# Patient Record
Sex: MALE | Race: WHITE | NOT HISPANIC OR LATINO | Employment: FULL TIME | URBAN - NONMETROPOLITAN AREA
[De-identification: names, ages, dates, MRNs, and addresses within clinical notes are randomized per-mention and may not be internally consistent; named-entity substitution may affect disease eponyms.]

---

## 2017-06-07 ENCOUNTER — TELEPHONE (OUTPATIENT)
Dept: OTOLARYNGOLOGY | Facility: CLINIC | Age: 47
End: 2017-06-07

## 2017-06-07 DIAGNOSIS — J39.8 TRACHEAL STENOSIS: Primary | ICD-10-CM

## 2017-06-07 NOTE — TELEPHONE ENCOUNTER
Dr. Verduzco asked that we try to get patient a sooner appointment with [facility]. I have called and patient did not have an appointment. The first available is 7/13/17 at 900. Patient notified. We will have patient get a CT soft tissue neck and CT chest to include the carana and have him fu with Dr. Verduzco after CT.

## 2017-06-12 ENCOUNTER — HOSPITAL ENCOUNTER (OUTPATIENT)
Dept: CT IMAGING | Facility: HOSPITAL | Age: 47
Discharge: HOME | End: 2017-06-12
Attending: OTOLARYNGOLOGY | Admitting: OTOLARYNGOLOGY

## 2017-06-12 ENCOUNTER — HOSPITAL ENCOUNTER (OUTPATIENT)
Dept: CT IMAGING | Facility: HOSPITAL | Age: 47
Discharge: HOME | End: 2017-06-12
Attending: OTOLARYNGOLOGY

## 2017-06-12 DIAGNOSIS — J39.8 TRACHEAL STENOSIS: ICD-10-CM

## 2017-06-12 PROCEDURE — 71260 CT THORAX DX C+: CPT

## 2017-06-12 PROCEDURE — 82565 ASSAY OF CREATININE: CPT

## 2017-06-12 PROCEDURE — 70491 CT SOFT TISSUE NECK W/DYE: CPT

## 2017-06-12 PROCEDURE — 0 IOPAMIDOL 61 % SOLUTION: Performed by: OTOLARYNGOLOGY

## 2017-06-12 RX ADMIN — IOPAMIDOL 100 ML: 612 INJECTION, SOLUTION INTRAVENOUS at 15:15

## 2017-06-13 LAB — CREAT BLDA-MCNC: 1.3 MG/DL (ref 0.6–1.3)

## 2017-06-20 ENCOUNTER — OFFICE VISIT (OUTPATIENT)
Dept: OTOLARYNGOLOGY | Facility: CLINIC | Age: 47
End: 2017-06-20

## 2017-06-20 VITALS
HEART RATE: 58 BPM | HEIGHT: 71 IN | BODY MASS INDEX: 31.64 KG/M2 | TEMPERATURE: 96.8 F | WEIGHT: 226 LBS | DIASTOLIC BLOOD PRESSURE: 91 MMHG | SYSTOLIC BLOOD PRESSURE: 151 MMHG

## 2017-06-20 DIAGNOSIS — Z98.84 STATUS POST GASTRIC BYPASS FOR OBESITY: ICD-10-CM

## 2017-06-20 DIAGNOSIS — J38.6 SUBGLOTTIC STENOSIS: Primary | ICD-10-CM

## 2017-06-20 PROCEDURE — 31575 DIAGNOSTIC LARYNGOSCOPY: CPT | Performed by: OTOLARYNGOLOGY

## 2017-06-20 PROCEDURE — 99213 OFFICE O/P EST LOW 20 MIN: CPT | Performed by: OTOLARYNGOLOGY

## 2017-06-20 NOTE — PROGRESS NOTES
OPERATIVE NOTE:  Jerome Crawley    DATE OF PROCEDURE: 06/20/2017    PROCEDURE:   Flexible Fiberoptic Laryngoscopy    ANESTHESIA:  None    REASON FOR PROCEDURE:  Procedure was recommend for persistant hoarseness and re-evaluation of a lesion previously documented  Risks, benefits and alternatives were discussed.      DETAILS of OPERATION:  The patient was seated in the exam chair.  A flexible fiberoptic laryngoscopy was performed through the oral cavity.  The scope was introduced into the oral cavity and directed to the level of the glottis, examining the structures of the oropharynx, base of tongue, vallecula, supraglottic larynx, glottic larynx, and hypopharynx.      FINDINGS:  Mucosal surfaces:   The mucosal surfaces demonstrated normal mucosa surfaces with mild inflammation    Base of tongue:  The base of tongue was found to have no mass or lesion.    Epiglottis:  The epiglottis was found to have no mass or lesion.    Aryepligottic fold:  The AE folds were found to have no mass or lesion.    False Vocal Fold:  The false cords were found to have no mass or lesion.    True Vocal Cord:  The true vocal cords were found to have no mass or lesion.  Both true vocal cords abducted and abducted normally to the midline    Arytenoid:   The arytenoids were found to have no mass or lesion.    Hypopharynx:  The hypopharynx was found to have no mass or lesion.    Subglottic lesions or obstructions can be appreciated and the patient was not acutely stridorous    The patient tolerated procedure well.

## 2017-06-20 NOTE — PROGRESS NOTES
YOB: 1970  Location: [facility]  Location Address: [address] [place] [address], [facility], [address] [place] [address]  Location Phone: [phone]    Chief Complaint   Patient presents with   • Follow-up     possible tracheal stenosis       History of Present Illness  Jerome Crawley is a 46 y.o. male.  Jerome Crawley is here for evaluation of ENT complaints. The patient has had problems with breathing, dysphagia and tracheal stenosis  The symptoms are localized to the generalized location of the throat/trachea. The patient has had moderate symptoms. The symptoms have been present for the last several months . The symptoms are aggravated by  swallowing . Patient had gastrointestinal surgery and had to have a tracheostomy on 12/16/15. Patient has since had trach removed but has recently experienced difficulty breathing. Patient also has sore throat, nasal congestion, nasal drainage and postnasal drainage. He has a history of sleep apnea and GERD.     He is approximately 1 year status post endoscopic balloon dilatation at [facility] for subglottic stenosis. He also had some tissue removed -laser or other I do not know without OP Report.    Study Result   EXAMINATION: CT CHEST W CONTRAST-      2017 2:39 PM CDT      HISTORY: tracheal stenosis; J39.8-Other specified diseases of upper  respiratory tract      In order to have a CT radiation dose as low as reasonably achievable  Automated Exposure Control was utilized for adjustment of the mA and/or  KV according to patient size.      DLP in mGycm= 1335.      Axial, sagittal, and coronal postcontrast chest CT imaging.      Subglottic tracheal stenosis again seen. This appears to be slightly  less severe.  The stenosis lies 7.2 cm above the pia.  Below the level of stenosis there is a normal appearance of the distal  trachea, pia, and each bronchus.      The lungs are fully expanded and clear.      Normal heart size.  No mediastinal mass.      Summary:  1. Known  subglottic tracheal stenosis. The distal trachea and pia are  normal.         Study Result   EXAMINATION: CT SOFT TISSUE NECK W CONTRAST-      6/12/2017 2:39 PM CDT      HISTORY: tracheal stenosis; J39.8-Other specified diseases of upper  respiratory tract      In order to have a CT radiation dose as low as reasonably achievable  Automated Exposure Control was utilized for adjustment of the mA and/or  KV according to patient size.      DLP in mGycm= 298.      Axial, sagittal, and coronal postcontrast CT imaging of the neck.      Essentially clear paranasal sinuses.  Normal nasopharynx and parapharyngeal spaces.  Normal and symmetric parotid and submandibular glands.      Normal thyroid gland.  Clear lung apices.      Web like subglottic stenosis approximately 3.5 cm below the level of the  vocal cords.  The stenosis is slightly less prominent now.   AP diameter of the airway is 6-7 mm compared with 2-3 mm on the previous  exam.      Summary:  1. Subglottic stenosis appears to be slightly less severe as compared  with 1 year ago. No new abnormality.                Past Medical History:   Diagnosis Date   • Abdominal hernia    • GERD (gastroesophageal reflux disease)     Mild   • Hyperlipidemia    • Hypertension    • Sleep apnea     CPAP; no longer needed       Past Surgical History:   Procedure Laterality Date   • BRONCHOSCOPY  12/16/2015    Flexible Fiberoptic Bronchoscopy with Debridement of Tracheal Fibrinous exudate   • ENDOSCOPY  12/11/2015    Dr Albaro Leep [facility]     • EXPLORATORY LAPAROTOMY  11/29/2015    Dr Shay    • EXPLORATORY LAPAROTOMY  12/16/2015    with washout, lysis of adhesions, feeding tube placement and repair of small bowel injury and esophagogastroduodenoscopy with Dr Hendricks    • HAND SURGERY  2007    Crushed hand    • HERNIA REPAIR  11/29/2015    Hernia reduction of internal hernia-Dr Shay [facility]     • LAPAROSCOPIC GASTRIC BYPASS  11/25/2015     Dr Shay [facility]    • LYSIS OF ABDOMINAL ADHESIONS  11/29/2015    Dr Irvin [facility]    • MANDIBLE FRACTURE SURGERY  1988   • SMALL INTESTINE SURGERY  11/29/2015    repair of small bowel injury and closure of mesenteric defect, Dr Shay [facility]      • SMALL INTESTINE SURGERY      repair per Dr Shay on 12-16-15   • TRACHEOSTOMY  12/16/2015    Dr Shay          Current Outpatient Prescriptions:   •  calcium citrate-vitamin d (CITRACAL) 200-250 MG-UNIT tablet tablet, Take 1 tablet by mouth Daily., Disp: , Rfl:   •  carvedilol (COREG) 12.5 MG tablet, 1 TABLET BY MOUTH TWICE A DAY TAKES THE PLACE OF COREG CR 40MG, Disp: , Rfl: 1  •  doxazosin (CARDURA) 2 MG tablet, Take 2 mg by mouth Every Night., Disp: , Rfl:   •  Multiple Vitamin (MULTI VITAMIN PO), Take  by mouth Daily. Bariatric Vitamin, Disp: , Rfl:   •  Omeprazole (PRILOSEC PO), Take 20 mg by mouth Daily., Disp: , Rfl:     Other    Family History   Problem Relation Age of Onset   • Obesity Other    • Diabetes Other    • Hypertension Other    • Heart disease Other    • Cancer Other        Social History     Social History   • Marital status: Single     Spouse name: N/A   • Number of children: N/A   • Years of education: N/A     Occupational History   • Not on file.     Social History Main Topics   • Smoking status: Never Smoker   • Smokeless tobacco: Not on file   • Alcohol use No   • Drug use: No   • Sexual activity: Not on file     Other Topics Concern   • Not on file     Social History Narrative       Review of Systems   Constitutional: Negative.    HENT: Positive for congestion, postnasal drip, rhinorrhea, sore throat and trouble swallowing.    Eyes: Negative.    Respiratory: Positive for apnea and shortness of breath.    Cardiovascular: Negative.    Gastrointestinal: Negative.    Endocrine: Negative.    Genitourinary: Negative.    Musculoskeletal: Negative.    Skin: Negative.     Allergic/Immunologic: Negative.    Neurological: Negative.    Hematological: Negative.    Psychiatric/Behavioral: Negative.        Vitals:    06/20/17 1011   BP: 151/91   Pulse: 58   Temp: 96.8 °F (36 °C)       Objective     Physical Exam  CONSTITUTIONAL: Mildly overweight, well nourished, alert, oriented, in no acute distress     COMMUNICATION AND VOICE: able to communicate normally, normal voice quality    HEAD: normocephalic, no lesions, atraumatic, no tenderness, no masses     FACE: appearance normal, no lesions, no tenderness, no deformities, facial motion symmetric    SALIVARY GLANDS: parotid glands with no tenderness, no swelling, no masses, submandibular glands with normal size, nontender    EYES: ocular motility normal, eyelids normal, orbits normal, no proptosis, conjunctiva normal , pupils equal, round     EARS:  Hearing: response to conversational voice normal bilaterally   External Ears: auricles without lesions  Otoscopic: tympanic membrane appearance normal, no lesions, no perforation, normal mobility, no fluid    NOSE:  External Nose: structure normal, no tenderness on palpation, no nasal discharge, no lesions, no evidence of trauma, nostrils patent   Intranasal Exam: nasal mucosa normal, vestibule within normal limits, inferior turbinate normal, nasal septum midline   Nasopharynx:     ORAL:  Lips: upper and lower lips without lesion   Teeth: dentition within normal limits for age   Gums: gingivae healthy   Oral Mucosa: oral mucosa normal, no mucosal lesions   Floor of Mouth: Warthin’s duct patent, mucosa normal  Tongue: lingual mucosa normal without lesions, normal tongue mobility   Palate: soft and hard palates with normal mucosa and structure  Oropharynx: oropharyngeal mucosa normal    HYPOPHARYNX:   LARYNX: See Endo    NECK: neck appearance normal, no mass,  noted without erythema or tenderness    THYROID: no overt thyromegaly, no tenderness, nodules or mass present on palpation, position  midline     LYMPH NODES: no lymphadenopathy    CHEST/RESPIRATORY: respiratory effort normal, normal breath sounds     CARDIOVASCULAR: rate and rhythm normal, extremities without cyanosis or edema      NEUROLOGIC/PSYCHIATRIC: oriented to time, place and person, mood normal, affect appropriate, CN II-XII intact grossly    Assessment/Plan   Jerome was seen today for follow-up.    Diagnoses and all orders for this visit:    Subglottic stenosis  Comments:  History of balloon dilatation    Status post gastric bypass for obesity      * Surgery not found *  No orders of the defined types were placed in this encounter.    Return in about 3 months (around 9/20/2017).       Patient Instructions   Follow-up at [facility]  Call or return for problems particularly increasing shortness of breath  Discussed with radiology    Follow-up in 3 months

## 2017-06-20 NOTE — PATIENT INSTRUCTIONS
Follow-up at [facility]  Call or return for problems particularly increasing shortness of breath  Discussed with radiology    Follow-up in 3 months

## 2017-07-10 ENCOUNTER — HOSPITAL ENCOUNTER (OUTPATIENT)
Dept: PULMONOLOGY | Facility: HOSPITAL | Age: 47
Discharge: HOME | End: 2017-07-10
Attending: OTOLARYNGOLOGY | Admitting: OTOLARYNGOLOGY

## 2017-07-10 VITALS — BODY MASS INDEX: 32.07 KG/M2 | HEIGHT: 70 IN | WEIGHT: 224 LBS

## 2017-07-10 DIAGNOSIS — J38.6 SUBGLOTTIC STENOSIS: ICD-10-CM

## 2017-07-10 PROCEDURE — 94726 PLETHYSMOGRAPHY LUNG VOLUMES: CPT

## 2017-07-10 PROCEDURE — 94060 EVALUATION OF WHEEZING: CPT

## 2017-07-10 PROCEDURE — 94729 DIFFUSING CAPACITY: CPT

## 2017-07-10 RX ORDER — ALBUTEROL SULFATE 2.5 MG/3ML
2.5 SOLUTION RESPIRATORY (INHALATION) ONCE
Status: COMPLETED | OUTPATIENT
Start: 2017-07-10 | End: 2017-07-10

## 2017-07-10 RX ADMIN — ALBUTEROL SULFATE 2.5 MG: 2.5 SOLUTION RESPIRATORY (INHALATION) at 14:46

## 2017-10-26 ENCOUNTER — OFFICE VISIT (OUTPATIENT)
Dept: BARIATRICS/WEIGHT MGMT | Facility: CLINIC | Age: 47
End: 2017-10-26

## 2017-10-26 ENCOUNTER — APPOINTMENT (OUTPATIENT)
Dept: LAB | Facility: HOSPITAL | Age: 47
End: 2017-10-26
Attending: NURSE PRACTITIONER

## 2017-10-26 VITALS
HEIGHT: 71 IN | SYSTOLIC BLOOD PRESSURE: 151 MMHG | BODY MASS INDEX: 31.25 KG/M2 | OXYGEN SATURATION: 100 % | TEMPERATURE: 99.1 F | WEIGHT: 223.2 LBS | HEART RATE: 71 BPM | DIASTOLIC BLOOD PRESSURE: 84 MMHG

## 2017-10-26 DIAGNOSIS — Z13.21 SCREENING FOR MALNUTRITION: ICD-10-CM

## 2017-10-26 DIAGNOSIS — Z98.84 STATUS POST GASTRIC BYPASS FOR OBESITY: Primary | ICD-10-CM

## 2017-10-26 LAB
25(OH)D3 SERPL-MCNC: 46.6 NG/ML (ref 30–100)
ALBUMIN SERPL-MCNC: 4.5 G/DL (ref 3.5–5)
ALBUMIN/GLOB SERPL: 1.5 G/DL (ref 1.1–2.5)
ALP SERPL-CCNC: 86 U/L (ref 24–120)
ALT SERPL W P-5'-P-CCNC: 44 U/L (ref 0–54)
ANION GAP SERPL CALCULATED.3IONS-SCNC: 14 MMOL/L (ref 4–13)
AST SERPL-CCNC: 33 U/L (ref 7–45)
BILIRUB SERPL-MCNC: 0.4 MG/DL (ref 0.1–1)
BUN BLD-MCNC: 20 MG/DL (ref 5–21)
BUN/CREAT SERPL: 14.5 (ref 7–25)
CALCIUM SPEC-SCNC: 9.2 MG/DL (ref 8.4–10.4)
CHLORIDE SERPL-SCNC: 102 MMOL/L (ref 98–110)
CO2 SERPL-SCNC: 28 MMOL/L (ref 24–31)
CREAT BLD-MCNC: 1.38 MG/DL (ref 0.5–1.4)
DEPRECATED RDW RBC AUTO: 45.5 FL (ref 40–54)
ERYTHROCYTE [DISTWIDTH] IN BLOOD BY AUTOMATED COUNT: 13.3 % (ref 12–15)
FOLATE SERPL-MCNC: 13.2 NG/ML
GFR SERPL CREATININE-BSD FRML MDRD: 55 ML/MIN/1.73
GLOBULIN UR ELPH-MCNC: 3 GM/DL
GLUCOSE BLD-MCNC: 67 MG/DL (ref 70–100)
HCT VFR BLD AUTO: 42.1 % (ref 40–52)
HGB BLD-MCNC: 13.7 G/DL (ref 14–18)
IRON 24H UR-MRATE: 55 MCG/DL (ref 42–180)
MAGNESIUM SERPL-MCNC: 2.2 MG/DL (ref 1.4–2.2)
MCH RBC QN AUTO: 30.5 PG (ref 28–32)
MCHC RBC AUTO-ENTMCNC: 32.5 G/DL (ref 33–36)
MCV RBC AUTO: 93.8 FL (ref 82–95)
PHOSPHATE SERPL-MCNC: 3.6 MG/DL (ref 2.5–4.5)
PLATELET # BLD AUTO: 206 10*3/MM3 (ref 130–400)
PMV BLD AUTO: 10 FL (ref 6–12)
POTASSIUM BLD-SCNC: 4.8 MMOL/L (ref 3.5–5.3)
PROT SERPL-MCNC: 7.5 G/DL (ref 6.3–8.7)
RBC # BLD AUTO: 4.49 10*6/MM3 (ref 4.8–5.9)
SODIUM BLD-SCNC: 144 MMOL/L (ref 135–145)
VIT B12 BLD-MCNC: 413 PG/ML (ref 239–931)
WBC NRBC COR # BLD: 8.53 10*3/MM3 (ref 4.8–10.8)

## 2017-10-26 PROCEDURE — 83735 ASSAY OF MAGNESIUM: CPT | Performed by: NURSE PRACTITIONER

## 2017-10-26 PROCEDURE — 84446 ASSAY OF VITAMIN E: CPT | Performed by: NURSE PRACTITIONER

## 2017-10-26 PROCEDURE — 85027 COMPLETE CBC AUTOMATED: CPT | Performed by: NURSE PRACTITIONER

## 2017-10-26 PROCEDURE — 83540 ASSAY OF IRON: CPT | Performed by: NURSE PRACTITIONER

## 2017-10-26 PROCEDURE — 84100 ASSAY OF PHOSPHORUS: CPT | Performed by: NURSE PRACTITIONER

## 2017-10-26 PROCEDURE — 80053 COMPREHEN METABOLIC PANEL: CPT | Performed by: NURSE PRACTITIONER

## 2017-10-26 PROCEDURE — 82746 ASSAY OF FOLIC ACID SERUM: CPT | Performed by: NURSE PRACTITIONER

## 2017-10-26 PROCEDURE — 84597 ASSAY OF VITAMIN K: CPT | Performed by: NURSE PRACTITIONER

## 2017-10-26 PROCEDURE — 84425 ASSAY OF VITAMIN B-1: CPT | Performed by: NURSE PRACTITIONER

## 2017-10-26 PROCEDURE — 36415 COLL VENOUS BLD VENIPUNCTURE: CPT | Performed by: NURSE PRACTITIONER

## 2017-10-26 PROCEDURE — 99213 OFFICE O/P EST LOW 20 MIN: CPT | Performed by: NURSE PRACTITIONER

## 2017-10-26 PROCEDURE — 84630 ASSAY OF ZINC: CPT | Performed by: NURSE PRACTITIONER

## 2017-10-26 PROCEDURE — 82306 VITAMIN D 25 HYDROXY: CPT | Performed by: NURSE PRACTITIONER

## 2017-10-26 PROCEDURE — 84590 ASSAY OF VITAMIN A: CPT | Performed by: NURSE PRACTITIONER

## 2017-10-26 PROCEDURE — 82607 VITAMIN B-12: CPT | Performed by: NURSE PRACTITIONER

## 2017-10-26 RX ORDER — BIOTIN 10000 MCG
1 CAPSULE ORAL DAILY
COMMUNITY
End: 2018-01-25

## 2017-10-26 NOTE — PROGRESS NOTES
"Subjective   Jerome Crawley is a 47 y.o. male.     History of Present Illness   Jerome Crawley is post RYGB bariatric surgery with Dr. Shay on 11/25/15. This is his 2 year follow-up. Patient is able to get 48 ounces of water in per day. Patient is able to get protein at each of his 3 meals per day, but is unsure of total protein grams.  Patient is taking a multiple vitamin per day. Patient is taking Calcium with vitamin D supplement 2 times daily. Patient is exercising by walking on treadmill for 20-45 minutes and 4-6 times per week. Patient does not have any complaints of reflux, dysphagia, N/V, or abdominal pain. He is on PPI for reflux.   Vitals:    10/26/17 1524   BP: 151/84   BP Location: Left arm   Patient Position: Sitting   Cuff Size: Adult   Pulse: 71   Temp: 99.1 °F (37.3 °C)   SpO2: 100%   Weight: 223 lb 3.2 oz (101 kg)   Height: 71\" (180.3 cm)         The following portions of the patient's history were reviewed and updated as appropriate: allergies, current medications, past family history, past medical history, past social history, past surgical history and problem list.    Review of Systems   Constitutional: Negative for activity change, appetite change and fatigue.   HENT: Negative.    Eyes: Negative.         Vision problems   Respiratory: Negative.  Negative for apnea, cough, chest tightness and shortness of breath.    Cardiovascular: Negative.  Negative for chest pain, palpitations and leg swelling.   Gastrointestinal: Negative.  Negative for abdominal pain, anal bleeding, constipation, nausea and vomiting.   Endocrine: Negative.         Hair loss   Genitourinary: Negative.    Musculoskeletal: Negative.  Negative for arthralgias and back pain.   Skin: Negative.    Allergic/Immunologic: Negative.    Neurological: Negative.  Negative for seizures and syncope.   Hematological: Negative.  Does not bruise/bleed easily.   Psychiatric/Behavioral: Positive for sleep disturbance. Negative for dysphoric " mood and self-injury.       Objective   Physical Exam   Constitutional: He is oriented to person, place, and time. Vital signs are normal. He appears well-developed and well-nourished. He is cooperative. No distress.   HENT:   Head: Normocephalic and atraumatic.   Nose: Nose normal.   Mouth/Throat: Oropharynx is clear and moist. No oropharyngeal exudate or tonsillar abscesses.   Eyes: Conjunctivae, EOM and lids are normal. Pupils are equal, round, and reactive to light. Right eye exhibits no discharge. Left eye exhibits no discharge.   Neck: Trachea normal. Neck supple. No JVD present. Carotid bruit is not present. No rigidity. No tracheal deviation present. No thyromegaly present.   Healed scar noted to lower anterior neck   Cardiovascular: Normal rate, regular rhythm, S1 normal, S2 normal and normal heart sounds.    Pulmonary/Chest: Effort normal and breath sounds normal. No stridor. No respiratory distress. He has no wheezes. He has no rales.   Abdominal: Soft. Bowel sounds are normal. He exhibits no distension. There is no tenderness.   Obese; healed scars noted; no hernia   Musculoskeletal: He exhibits no edema.        Right shoulder: He exhibits normal strength.   Lymphadenopathy:     He has no cervical adenopathy.   Neurological: He is alert and oriented to person, place, and time. He has normal strength. No cranial nerve deficit.   Skin: Skin is warm, dry and intact. No rash noted.   Psychiatric: He has a normal mood and affect. His speech is normal and behavior is normal.   Alert and oriented x 3   Vitals reviewed.      Assessment/Plan   Jerome was seen today for post-op, obesity, weight loss and nutrition counseling.    Diagnoses and all orders for this visit:    Status post gastric bypass for obesity  -     CBC (No Diff)  -     Comprehensive Metabolic Panel  -     Folate  -     Iron  -     Magnesium  -     Phosphorus  -     Vitamin A  -     Vitamin B1, Whole Blood  -     Vitamin B12  -     Vitamin D 25  Hydroxy  -     Vitamin E  -     Vitamin K1  -     Zinc    Body mass index 31.0-31.9, adult  -     CBC (No Diff)  -     Comprehensive Metabolic Panel  -     Folate  -     Iron  -     Magnesium  -     Phosphorus  -     Vitamin A  -     Vitamin B1, Whole Blood  -     Vitamin B12  -     Vitamin D 25 Hydroxy  -     Vitamin E  -     Vitamin K1  -     Zinc    Screening for malnutrition  -     CBC (No Diff)  -     Comprehensive Metabolic Panel  -     Folate  -     Iron  -     Magnesium  -     Phosphorus  -     Vitamin A  -     Vitamin B1, Whole Blood  -     Vitamin B12  -     Vitamin D 25 Hydroxy  -     Vitamin E  -     Vitamin K1  -     Zinc          Patient is doing well overall. BMI 31.13.   Lab work will be drawn today. Results will be called to you once everything is completed.    Goals: continue positive lifestyle changes with diet and exercise.   Handout provided on support group meetings, 5 1/2 cup meals handout provided, post op annual visit handout provided.  Patient will follow up in 12 months and will call the office if needs anything prior to that appointment.

## 2017-10-26 NOTE — PATIENT INSTRUCTIONS
Patient is doing well overall. BMI 31.13.   Lab work will be drawn today. Results will be called to you once everything is completed.    Goals: continue positive lifestyle changes with diet and exercise.   Handout provided on support group meetings, 5 1/2 cup meals handout provided, post op annual visit handout provided.  Patient will follow up in 12 months and will call the office if needs anything prior to that appointment.

## 2017-10-28 LAB — ZINC SERPL-MCNC: 54 UG/DL (ref 56–134)

## 2017-10-30 LAB
A-TOCOPHEROL VIT E SERPL-MCNC: 13.4 MG/L (ref 5.3–17.5)
VIT A SERPL-MCNC: 74 UG/DL (ref 24–85)

## 2017-11-01 LAB — VIT B1 BLD-SCNC: 162.5 NMOL/L (ref 66.5–200)

## 2017-11-02 LAB — PHYTONADIONE SERPL-MCNC: 0.54 NG/ML (ref 0.13–1.88)

## 2018-01-25 ENCOUNTER — OFFICE VISIT (OUTPATIENT)
Dept: GASTROENTEROLOGY | Facility: CLINIC | Age: 48
End: 2018-01-25

## 2018-01-25 VITALS
SYSTOLIC BLOOD PRESSURE: 144 MMHG | BODY MASS INDEX: 31.08 KG/M2 | HEART RATE: 62 BPM | TEMPERATURE: 95.4 F | HEIGHT: 71 IN | OXYGEN SATURATION: 99 % | WEIGHT: 222 LBS | DIASTOLIC BLOOD PRESSURE: 88 MMHG

## 2018-01-25 DIAGNOSIS — K27.9 PEPTIC ULCER DISEASE: ICD-10-CM

## 2018-01-25 DIAGNOSIS — I10 ESSENTIAL HYPERTENSION: ICD-10-CM

## 2018-01-25 DIAGNOSIS — Z87.19 HISTORY OF GI BLEED: Primary | ICD-10-CM

## 2018-01-25 PROCEDURE — 99204 OFFICE O/P NEW MOD 45 MIN: CPT | Performed by: NURSE PRACTITIONER

## 2018-01-25 RX ORDER — SUCRALFATE 1 G/1
1 TABLET ORAL 4 TIMES DAILY
COMMUNITY
End: 2020-04-24

## 2018-01-25 RX ORDER — PANTOPRAZOLE SODIUM 40 MG/1
40 TABLET, DELAYED RELEASE ORAL DAILY
COMMUNITY
End: 2020-04-24

## 2018-01-25 RX ORDER — POLYETHYLENE GLYCOL 3350, SODIUM CHLORIDE, SODIUM BICARBONATE, POTASSIUM CHLORIDE 420; 11.2; 5.72; 1.48 G/4L; G/4L; G/4L; G/4L
4000 POWDER, FOR SOLUTION ORAL SEE ADMIN INSTRUCTIONS
Qty: 4000 ML | Refills: 0 | Status: SHIPPED | OUTPATIENT
Start: 2018-01-25 | End: 2018-02-24 | Stop reason: HOSPADM

## 2018-01-25 NOTE — PROGRESS NOTES
"[facility] GASTROENTEROLOGY - OFFICE NOTE    1/25/2018    Jerome Crawley   1970    Primary Physician: Sanjay Tyson MD    Chief Complaint   Patient presents with   • GI Problem     dark stools         HISTORY OF PRESENT ILLNESS    Jerome Crawley is a 47 y.o. male presents For her history of GI bleed and \"ulcers\".  About 3-4 weeks ago he was in [place] and developed rectal bleeding manifested by red blood from his rectum.  Lasted about 12 hours.  He went to a hospital in [place] and was admitted.  He had an EGD was told he had a \"ulcer\".  Was told by physician there that he needed to have a colonoscopy in the near future.  He has had no further bleeding since then.  He has been taking omeprazole.  He has been on omeprazole for several years.  About a month prior to the episode of bleeding he was on ranitidine.  He denied taking any aspirin or NSAIDs.  Never had black stool.  No nausea or vomiting.  No abdominal pain.  No unintentional weight loss.  No family history of colorectal cancer colon polyps.      Past Medical History:   Diagnosis Date   • Abdominal hernia    • GERD (gastroesophageal reflux disease)     Mild   • Hyperlipidemia    • Hypertension    • Sleep apnea     CPAP; no longer needed       Past Surgical History:   Procedure Laterality Date   • BRONCHOSCOPY  12/16/2015    Flexible Fiberoptic Bronchoscopy with Debridement of Tracheal Fibrinous exudate   • ENDOSCOPY  12/11/2015    Dr Irvin [facility]     • EXPLORATORY LAPAROTOMY  11/29/2015    Dr Shay    • EXPLORATORY LAPAROTOMY  12/16/2015    with washout, lysis of adhesions, feeding tube placement and repair of small bowel injury and esophagogastroduodenoscopy with Dr Hendricks    • HAND SURGERY  2007    Crushed hand    • HERNIA REPAIR  11/29/2015    Hernia reduction of internal hernia-Dr Shay [facility]     • LAPAROSCOPIC GASTRIC BYPASS  11/25/2015    Dr Shay [facility]    • LYSIS " OF ABDOMINAL ADHESIONS  11/29/2015    Dr Irvin [facility]    • MANDIBLE FRACTURE SURGERY  1988   • SMALL INTESTINE SURGERY  11/29/2015    repair of small bowel injury and closure of mesenteric defect, Dr Shay [facility]      • SMALL INTESTINE SURGERY      repair per Dr Shay on 12-16-15   • TRACHEOSTOMY  12/16/2015    Dr Shay        Outpatient Prescriptions Marked as Taking for the 1/25/18 encounter (Office Visit) with MIGUEL ANGEL William   Medication Sig Dispense Refill   • calcium citrate-vitamin d (CITRACAL) 200-250 MG-UNIT tablet tablet Take 1 tablet by mouth Daily.     • carvedilol (COREG) 12.5 MG tablet 1 TABLET BY MOUTH TWICE A DAY TAKES THE PLACE OF COREG CR 40MG  1   • doxazosin (CARDURA) 2 MG tablet Take 2 mg by mouth Every Night.     • Multiple Vitamin (MULTI VITAMIN PO) Take  by mouth Daily. Bariatric Vitamin     • Omeprazole (PRILOSEC PO) Take 20 mg by mouth Daily.     • pantoprazole (PROTONIX) 40 MG EC tablet Take 40 mg by mouth Daily.     • sucralfate (CARAFATE) 1 g tablet Take 1 g by mouth 4 (Four) Times a Day.         Allergies   Allergen Reactions   • Other      Dust        Social History     Social History   • Marital status: Single     Spouse name: N/A   • Number of children: N/A   • Years of education: N/A     Occupational History   • Not on file.     Social History Main Topics   • Smoking status: Never Smoker   • Smokeless tobacco: Current User   • Alcohol use Yes      Comment: rare   • Drug use: No   • Sexual activity: Not on file     Other Topics Concern   • Not on file     Social History Narrative       Family History   Problem Relation Age of Onset   • Obesity Other    • Diabetes Other    • Hypertension Other    • Heart disease Other    • Cancer Other    • Colon cancer Neg Hx    • Colon polyps Neg Hx        Review of Systems   Constitutional: Negative for appetite change, chills, fatigue, fever and unexpected weight change.   HENT: Negative for  "sore throat and trouble swallowing.    Respiratory: Negative for cough, chest tightness, shortness of breath and wheezing.    Cardiovascular: Negative for chest pain and palpitations.   Gastrointestinal: Negative for abdominal distention, abdominal pain, constipation, diarrhea, nausea, rectal pain and vomiting.        As mentioned in hpi   Genitourinary: Negative for difficulty urinating and hematuria.   Musculoskeletal: Negative for arthralgias and back pain.   Skin: Negative for color change and rash.   Neurological: Negative for dizziness, seizures, syncope, light-headedness and headaches.   Psychiatric/Behavioral: Negative for confusion. The patient is not nervous/anxious.         Vitals:    01/25/18 0856   BP: 144/88   BP Location: Left arm   Patient Position: Sitting   Cuff Size: Adult   Pulse: 62   Temp: 95.4 °F (35.2 °C)   SpO2: 99%   Weight: 101 kg (222 lb)   Height: 180.3 cm (71\")      Body mass index is 30.96 kg/(m^2).    Physical Exam   Constitutional: He appears well-developed and well-nourished. No distress.   HENT:   Head: Normocephalic and atraumatic.   Eyes: EOM are normal. No scleral icterus.   Neck: Neck supple. No JVD present.   Cardiovascular: Normal rate, regular rhythm and normal heart sounds.    Pulmonary/Chest: Effort normal and breath sounds normal. No stridor.   Abdominal: Soft. Bowel sounds are normal. He exhibits no distension and no mass. There is no tenderness. There is no rebound and no guarding.   Musculoskeletal: He exhibits no edema.   Neurological: He is alert.   Skin: Skin is warm and dry. No rash noted.   Psychiatric: He has a normal mood and affect. His behavior is normal.   Vitals reviewed.              ASSESSMENT AND PLAN    Jerome was seen today for gi problem.    Diagnoses and all orders for this visit:    History of GI bleed  -     Case Request; Standing  -     Case Request    Peptic ulcer disease  -     Case Request; Standing  -     Case Request    Essential " "hypertension    Other orders  -     Implement Anesthesia Orders Day of Procedure; Standing  -     Obtain Informed Consent; Standing  -     polyethylene glycol-electrolytes (NULYTELY WITH FLAVOR PACKS) 420 g solution; Take 4,000 mL by mouth See Admin Instructions.    Recent GI bleed manifested by dark red blood from the rectum.  He did have an upper endoscopy that revealed a \"ulcer\".  He has never had a colonoscopy.  We are going to schedule him for colonoscopy as well.  We will repeat EGD as well to confirm healing of ulcer.  Recommend continue to take PPI on a daily basis.  No aspirin or NSAIDs.  The patient was advised to take any blood pressure or heart  medications the morning of  procedure if that is when he/she normally takes.  In the meantime he is to call with any questions concerns or problems.  Currently he has no signs of active bleeding.  We will also see if we can get records from the [facility], patient will call us to let us know the name of the facility.          ESOPHAGOGASTRODUODENOSCOPY WITH ANESTHESIA (N/A), COLONOSCOPY WITH ANESTHESIA (N/A)   Risk, benefits, and alternatives of endoscopy were explained in full.  They understand that there is a risk of bleeding, perforation, and infection.  The risk of perforation goes up with esophageal dilation.  Other options to evaluate UGI complaints could involve barium swallow or UGI series, but these would be diagnostic tests only.  Patient was given time to ask questions.  I answered them to their satisfaction and they are agreeable to proceedingAll risks, benefits, alternatives, and indications of colonoscopy procedure have been discussed with the patient. Risks to include perforation of the colon requiring possible surgery or colostomy, risk of bleeding from biopsies or removal of colon tissue, possibility of missing a colon polyp or cancer, or adverse drug reaction.  Benefits to include the diagnosis and management of disease of the colon and " rectum. Alternatives to include barium enema, radiographic evaluation, lab testing or no intervention. Pt verbalizes understanding and agrees.         Body mass index is 30.96 kg/(m^2).           MIGUEL ANGEL Schofield    EMR Dragon/transcription disclaimer:  Much of this encounter note is electronic transcription/translation of spoken language to printed text.  The electronic translation of spoken language may be erroneous, or at times, nonsensical words or phrases may be inadvertently transcribed.  Although I have reviewed the note for such errors, some may still exist.    Received records from [facility] in [place], EGD was done on 12/29/2017 and the impression was normal hypopharynx, normal proximal to the esophagus middle third of the esophagus and distal third of the esophagus, normal jejunum, mendel limb,  marginal ulcer at the esophago-jejunal anastomosis, argon plasma coagulation was applied.  Recommend an EGD in 3 months.  Path from the jejunal take off biopsy was benign.

## 2018-02-12 ENCOUNTER — ANESTHESIA EVENT (OUTPATIENT)
Dept: GASTROENTEROLOGY | Facility: HOSPITAL | Age: 48
End: 2018-02-12

## 2018-02-12 ENCOUNTER — ANESTHESIA (OUTPATIENT)
Dept: GASTROENTEROLOGY | Facility: HOSPITAL | Age: 48
End: 2018-02-12

## 2018-02-12 ENCOUNTER — HOSPITAL ENCOUNTER (OUTPATIENT)
Facility: HOSPITAL | Age: 48
Setting detail: HOSPITAL OUTPATIENT SURGERY
Discharge: HOME | End: 2018-02-12
Attending: INTERNAL MEDICINE | Admitting: INTERNAL MEDICINE

## 2018-02-12 VITALS
DIASTOLIC BLOOD PRESSURE: 88 MMHG | WEIGHT: 223 LBS | HEART RATE: 56 BPM | TEMPERATURE: 97.5 F | HEIGHT: 71 IN | SYSTOLIC BLOOD PRESSURE: 132 MMHG | RESPIRATION RATE: 16 BRPM | OXYGEN SATURATION: 97 % | BODY MASS INDEX: 31.22 KG/M2

## 2018-02-12 DIAGNOSIS — Z87.19 HISTORY OF GI BLEED: ICD-10-CM

## 2018-02-12 DIAGNOSIS — K27.9 PEPTIC ULCER DISEASE: ICD-10-CM

## 2018-02-12 PROCEDURE — 43235 EGD DIAGNOSTIC BRUSH WASH: CPT | Performed by: INTERNAL MEDICINE

## 2018-02-12 PROCEDURE — 45385 COLONOSCOPY W/LESION REMOVAL: CPT | Performed by: INTERNAL MEDICINE

## 2018-02-12 PROCEDURE — 88305 TISSUE EXAM BY PATHOLOGIST: CPT | Performed by: INTERNAL MEDICINE

## 2018-02-12 PROCEDURE — 25010000002 PROPOFOL 10 MG/ML EMULSION: Performed by: NURSE ANESTHETIST, CERTIFIED REGISTERED

## 2018-02-12 DEVICE — DEV CLIP ENDO RESOLUTION360 CONTRL ROT 235CM: Type: IMPLANTABLE DEVICE | Site: TRANSVERSE COLON | Status: FUNCTIONAL

## 2018-02-12 RX ORDER — SODIUM CHLORIDE 9 MG/ML
500 INJECTION, SOLUTION INTRAVENOUS CONTINUOUS PRN
Status: DISCONTINUED | OUTPATIENT
Start: 2018-02-12 | End: 2018-02-12 | Stop reason: HOSPADM

## 2018-02-12 RX ORDER — SODIUM CHLORIDE 0.9 % (FLUSH) 0.9 %
1-10 SYRINGE (ML) INJECTION AS NEEDED
Status: DISCONTINUED | OUTPATIENT
Start: 2018-02-12 | End: 2018-02-12 | Stop reason: HOSPADM

## 2018-02-12 RX ORDER — LIDOCAINE HYDROCHLORIDE 20 MG/ML
INJECTION, SOLUTION INFILTRATION; PERINEURAL AS NEEDED
Status: DISCONTINUED | OUTPATIENT
Start: 2018-02-12 | End: 2018-02-12 | Stop reason: SURG

## 2018-02-12 RX ORDER — SODIUM CHLORIDE 9 MG/ML
100 INJECTION, SOLUTION INTRAVENOUS CONTINUOUS
Status: DISCONTINUED | OUTPATIENT
Start: 2018-02-12 | End: 2018-02-12 | Stop reason: HOSPADM

## 2018-02-12 RX ORDER — METOPROLOL TARTRATE 5 MG/5ML
2 INJECTION INTRAVENOUS ONCE AS NEEDED
Status: COMPLETED | OUTPATIENT
Start: 2018-02-12 | End: 2018-02-12

## 2018-02-12 RX ORDER — SODIUM CHLORIDE 0.9 % (FLUSH) 0.9 %
3 SYRINGE (ML) INJECTION AS NEEDED
Status: DISCONTINUED | OUTPATIENT
Start: 2018-02-12 | End: 2018-02-12 | Stop reason: HOSPADM

## 2018-02-12 RX ORDER — PROPOFOL 10 MG/ML
VIAL (ML) INTRAVENOUS AS NEEDED
Status: DISCONTINUED | OUTPATIENT
Start: 2018-02-12 | End: 2018-02-12 | Stop reason: SURG

## 2018-02-12 RX ORDER — ONDANSETRON 2 MG/ML
4 INJECTION INTRAMUSCULAR; INTRAVENOUS ONCE AS NEEDED
Status: DISCONTINUED | OUTPATIENT
Start: 2018-02-12 | End: 2018-02-12 | Stop reason: HOSPADM

## 2018-02-12 RX ADMIN — PROPOFOL 50 MG: 10 INJECTION, EMULSION INTRAVENOUS at 09:41

## 2018-02-12 RX ADMIN — PROPOFOL 50 MG: 10 INJECTION, EMULSION INTRAVENOUS at 09:52

## 2018-02-12 RX ADMIN — LIDOCAINE HYDROCHLORIDE 20 MG: 20 INJECTION, SOLUTION INFILTRATION; PERINEURAL at 09:40

## 2018-02-12 RX ADMIN — PROPOFOL 50 MG: 10 INJECTION, EMULSION INTRAVENOUS at 10:02

## 2018-02-12 RX ADMIN — PROPOFOL 50 MG: 10 INJECTION, EMULSION INTRAVENOUS at 09:47

## 2018-02-12 RX ADMIN — LIDOCAINE HYDROCHLORIDE 0.5 ML: 10 INJECTION, SOLUTION EPIDURAL; INFILTRATION; INTRACAUDAL; PERINEURAL at 07:55

## 2018-02-12 RX ADMIN — METOPROLOL TARTRATE 2 MG: 5 INJECTION INTRAVENOUS at 08:23

## 2018-02-12 RX ADMIN — SODIUM CHLORIDE 500 ML: 9 INJECTION, SOLUTION INTRAVENOUS at 07:55

## 2018-02-12 RX ADMIN — PROPOFOL 50 MG: 10 INJECTION, EMULSION INTRAVENOUS at 10:05

## 2018-02-12 RX ADMIN — PROPOFOL 50 MG: 10 INJECTION, EMULSION INTRAVENOUS at 09:55

## 2018-02-12 RX ADMIN — PROPOFOL 50 MG: 10 INJECTION, EMULSION INTRAVENOUS at 10:07

## 2018-02-12 RX ADMIN — PROPOFOL 50 MG: 10 INJECTION, EMULSION INTRAVENOUS at 10:00

## 2018-02-12 RX ADMIN — PROPOFOL 100 MG: 10 INJECTION, EMULSION INTRAVENOUS at 09:40

## 2018-02-12 RX ADMIN — PROPOFOL 50 MG: 10 INJECTION, EMULSION INTRAVENOUS at 09:50

## 2018-02-12 RX ADMIN — PROPOFOL 50 MG: 10 INJECTION, EMULSION INTRAVENOUS at 09:45

## 2018-02-12 NOTE — ANESTHESIA POSTPROCEDURE EVALUATION
Patient: Jerome Crawley    Procedure Summary     Date Anesthesia Start Anesthesia Stop Room / Location    02/12/18 0936 1012 [facility] ENDOSCOPY 4 / [facility] END[facility]       Procedure Diagnosis Surgeon Provider    ESOPHAGOGASTRODUODENOSCOPY WITH ANESTHESIA (N/A Esophagus); COLONOSCOPY WITH ANESTHESIA (N/A ) Peptic ulcer disease; History of GI bleed  (Peptic ulcer disease [K27.9]; History of GI bleed [Z87.19]) MD Leigh Ann Bourne CRNA          Anesthesia Type: general  Last vitals  BP   130/71 (02/12/18 1016)   Temp   97.5 °F (36.4 °C) (02/12/18 0736)   Pulse   73 (02/12/18 1016)   Resp   17 (02/12/18 1016)     SpO2   94 % (02/12/18 1016)     Post Anesthesia Care and Evaluation    Patient location during evaluation: PHASE II  Patient participation: complete - patient participated  Level of consciousness: awake and alert  Pain score: 0  Pain management: adequate  Airway patency: patent  Anesthetic complications: No anesthetic complications  PONV Status: none  Cardiovascular status: acceptable, hemodynamically stable and stable  Respiratory status: acceptable and room air  Hydration status: acceptable

## 2018-02-12 NOTE — PLAN OF CARE
Problem: GI Endoscopy (Adult)  Goal: Signs and Symptoms of Listed Potential Problems Will be Absent or Manageable (GI Endoscopy)  Outcome: Outcome(s) achieved Date Met: 02/12/18

## 2018-02-12 NOTE — PLAN OF CARE
Problem: Patient Care Overview (Adult)  Goal: Adult Individualization and Mutuality  Outcome: Ongoing (interventions implemented as appropriate)   02/12/18 0734   Individualization   Patient Specific Preferences none   Patient Specific Goals none   Patient Specific Interventions none   Mutuality/Individual Preferences   What Anxieties, Fears or Concerns Do You Have About Your Health or Care? none   What Questions Do You Have About Your Health or Care? none   What Information Would Help Us Give You More Personalized Care? none

## 2018-02-12 NOTE — H&P (VIEW-ONLY)
"[facility] GASTROENTEROLOGY - OFFICE NOTE    1/25/2018    Jerome Crawley   1970    Primary Physician: Sanjay Tyson MD    Chief Complaint   Patient presents with   • GI Problem     dark stools         HISTORY OF PRESENT ILLNESS    Jerome Crawley is a 47 y.o. male presents For her history of GI bleed and \"ulcers\".  About 3-4 weeks ago he was in [place] and developed rectal bleeding manifested by red blood from his rectum.  Lasted about 12 hours.  He went to a hospital in [place] and was admitted.  He had an EGD was told he had a \"ulcer\".  Was told by physician there that he needed to have a colonoscopy in the near future.  He has had no further bleeding since then.  He has been taking omeprazole.  He has been on omeprazole for several years.  About a month prior to the episode of bleeding he was on ranitidine.  He denied taking any aspirin or NSAIDs.  Never had black stool.  No nausea or vomiting.  No abdominal pain.  No unintentional weight loss.  No family history of colorectal cancer colon polyps.      Past Medical History:   Diagnosis Date   • Abdominal hernia    • GERD (gastroesophageal reflux disease)     Mild   • Hyperlipidemia    • Hypertension    • Sleep apnea     CPAP; no longer needed       Past Surgical History:   Procedure Laterality Date   • BRONCHOSCOPY  12/16/2015    Flexible Fiberoptic Bronchoscopy with Debridement of Tracheal Fibrinous exudate   • ENDOSCOPY  12/11/2015    Dr Irvin [facility]     • EXPLORATORY LAPAROTOMY  11/29/2015    Dr Shay    • EXPLORATORY LAPAROTOMY  12/16/2015    with washout, lysis of adhesions, feeding tube placement and repair of small bowel injury and esophagogastroduodenoscopy with Dr Hendricks    • HAND SURGERY  2007    Crushed hand    • HERNIA REPAIR  11/29/2015    Hernia reduction of internal hernia-Dr Shay [facility]     • LAPAROSCOPIC GASTRIC BYPASS  11/25/2015    Dr Shay [facility]    • LYSIS " OF ABDOMINAL ADHESIONS  11/29/2015    Dr Irvin [facility]    • MANDIBLE FRACTURE SURGERY  1988   • SMALL INTESTINE SURGERY  11/29/2015    repair of small bowel injury and closure of mesenteric defect, Dr Shay [facility]      • SMALL INTESTINE SURGERY      repair per Dr Shay on 12-16-15   • TRACHEOSTOMY  12/16/2015    Dr Shay        Outpatient Prescriptions Marked as Taking for the 1/25/18 encounter (Office Visit) with MIGUEL ANGEL William   Medication Sig Dispense Refill   • calcium citrate-vitamin d (CITRACAL) 200-250 MG-UNIT tablet tablet Take 1 tablet by mouth Daily.     • carvedilol (COREG) 12.5 MG tablet 1 TABLET BY MOUTH TWICE A DAY TAKES THE PLACE OF COREG CR 40MG  1   • doxazosin (CARDURA) 2 MG tablet Take 2 mg by mouth Every Night.     • Multiple Vitamin (MULTI VITAMIN PO) Take  by mouth Daily. Bariatric Vitamin     • Omeprazole (PRILOSEC PO) Take 20 mg by mouth Daily.     • pantoprazole (PROTONIX) 40 MG EC tablet Take 40 mg by mouth Daily.     • sucralfate (CARAFATE) 1 g tablet Take 1 g by mouth 4 (Four) Times a Day.         Allergies   Allergen Reactions   • Other      Dust        Social History     Social History   • Marital status: Single     Spouse name: N/A   • Number of children: N/A   • Years of education: N/A     Occupational History   • Not on file.     Social History Main Topics   • Smoking status: Never Smoker   • Smokeless tobacco: Current User   • Alcohol use Yes      Comment: rare   • Drug use: No   • Sexual activity: Not on file     Other Topics Concern   • Not on file     Social History Narrative       Family History   Problem Relation Age of Onset   • Obesity Other    • Diabetes Other    • Hypertension Other    • Heart disease Other    • Cancer Other    • Colon cancer Neg Hx    • Colon polyps Neg Hx        Review of Systems   Constitutional: Negative for appetite change, chills, fatigue, fever and unexpected weight change.   HENT: Negative for  "sore throat and trouble swallowing.    Respiratory: Negative for cough, chest tightness, shortness of breath and wheezing.    Cardiovascular: Negative for chest pain and palpitations.   Gastrointestinal: Negative for abdominal distention, abdominal pain, constipation, diarrhea, nausea, rectal pain and vomiting.        As mentioned in hpi   Genitourinary: Negative for difficulty urinating and hematuria.   Musculoskeletal: Negative for arthralgias and back pain.   Skin: Negative for color change and rash.   Neurological: Negative for dizziness, seizures, syncope, light-headedness and headaches.   Psychiatric/Behavioral: Negative for confusion. The patient is not nervous/anxious.         Vitals:    01/25/18 0856   BP: 144/88   BP Location: Left arm   Patient Position: Sitting   Cuff Size: Adult   Pulse: 62   Temp: 95.4 °F (35.2 °C)   SpO2: 99%   Weight: 101 kg (222 lb)   Height: 180.3 cm (71\")      Body mass index is 30.96 kg/(m^2).    Physical Exam   Constitutional: He appears well-developed and well-nourished. No distress.   HENT:   Head: Normocephalic and atraumatic.   Eyes: EOM are normal. No scleral icterus.   Neck: Neck supple. No JVD present.   Cardiovascular: Normal rate, regular rhythm and normal heart sounds.    Pulmonary/Chest: Effort normal and breath sounds normal. No stridor.   Abdominal: Soft. Bowel sounds are normal. He exhibits no distension and no mass. There is no tenderness. There is no rebound and no guarding.   Musculoskeletal: He exhibits no edema.   Neurological: He is alert.   Skin: Skin is warm and dry. No rash noted.   Psychiatric: He has a normal mood and affect. His behavior is normal.   Vitals reviewed.              ASSESSMENT AND PLAN    Jerome was seen today for gi problem.    Diagnoses and all orders for this visit:    History of GI bleed  -     Case Request; Standing  -     Case Request    Peptic ulcer disease  -     Case Request; Standing  -     Case Request    Essential " "hypertension    Other orders  -     Implement Anesthesia Orders Day of Procedure; Standing  -     Obtain Informed Consent; Standing  -     polyethylene glycol-electrolytes (NULYTELY WITH FLAVOR PACKS) 420 g solution; Take 4,000 mL by mouth See Admin Instructions.    Recent GI bleed manifested by dark red blood from the rectum.  He did have an upper endoscopy that revealed a \"ulcer\".  He has never had a colonoscopy.  We are going to schedule him for colonoscopy as well.  We will repeat EGD as well to confirm healing of ulcer.  Recommend continue to take PPI on a daily basis.  No aspirin or NSAIDs.  The patient was advised to take any blood pressure or heart  medications the morning of  procedure if that is when he/she normally takes.  In the meantime he is to call with any questions concerns or problems.  Currently he has no signs of active bleeding.  We will also see if we can get records from the [facility], patient will call us to let us know the name of the facility.          ESOPHAGOGASTRODUODENOSCOPY WITH ANESTHESIA (N/A), COLONOSCOPY WITH ANESTHESIA (N/A)   Risk, benefits, and alternatives of endoscopy were explained in full.  They understand that there is a risk of bleeding, perforation, and infection.  The risk of perforation goes up with esophageal dilation.  Other options to evaluate UGI complaints could involve barium swallow or UGI series, but these would be diagnostic tests only.  Patient was given time to ask questions.  I answered them to their satisfaction and they are agreeable to proceedingAll risks, benefits, alternatives, and indications of colonoscopy procedure have been discussed with the patient. Risks to include perforation of the colon requiring possible surgery or colostomy, risk of bleeding from biopsies or removal of colon tissue, possibility of missing a colon polyp or cancer, or adverse drug reaction.  Benefits to include the diagnosis and management of disease of the colon and " rectum. Alternatives to include barium enema, radiographic evaluation, lab testing or no intervention. Pt verbalizes understanding and agrees.         Body mass index is 30.96 kg/(m^2).           MIGUEL ANGEL Schofield    EMR Dragon/transcription disclaimer:  Much of this encounter note is electronic transcription/translation of spoken language to printed text.  The electronic translation of spoken language may be erroneous, or at times, nonsensical words or phrases may be inadvertently transcribed.  Although I have reviewed the note for such errors, some may still exist.    Received records from [facility] in [place], EGD was done on 12/29/2017 and the impression was normal hypopharynx, normal proximal to the esophagus middle third of the esophagus and distal third of the esophagus, normal jejunum, mendel limb,  marginal ulcer at the esophago-jejunal anastomosis, argon plasma coagulation was applied.  Recommend an EGD in 3 months.  Path from the jejunal take off biopsy was benign.

## 2018-02-12 NOTE — PLAN OF CARE
Problem: Patient Care Overview (Adult)  Goal: Plan of Care Review  Outcome: Outcome(s) achieved Date Met: 02/12/18 02/12/18 1029   Patient Care Overview   Progress improving   Outcome Evaluation   Outcome Summary/Follow up Plan D/C CRITERIA MET   Coping/Psychosocial Response Interventions   Plan Of Care Reviewed With patient

## 2018-02-13 LAB
LAB AP CASE REPORT: NORMAL
Lab: NORMAL
PATH REPORT.FINAL DX SPEC: NORMAL
PATH REPORT.GROSS SPEC: NORMAL

## 2018-02-21 ENCOUNTER — HOSPITAL ENCOUNTER (INPATIENT)
Facility: HOSPITAL | Age: 48
LOS: 3 days | Discharge: HOME | End: 2018-02-24
Attending: EMERGENCY MEDICINE | Admitting: FAMILY MEDICINE

## 2018-02-21 ENCOUNTER — APPOINTMENT (OUTPATIENT)
Dept: CT IMAGING | Facility: HOSPITAL | Age: 48
End: 2018-02-21

## 2018-02-21 DIAGNOSIS — K92.2 GASTROINTESTINAL HEMORRHAGE, UNSPECIFIED GASTROINTESTINAL HEMORRHAGE TYPE: Primary | ICD-10-CM

## 2018-02-21 DIAGNOSIS — Z87.19 HISTORY OF GI BLEED: ICD-10-CM

## 2018-02-21 LAB
ABO GROUP BLD: NORMAL
ALBUMIN SERPL-MCNC: 3.3 G/DL (ref 3.5–5)
ALBUMIN/GLOB SERPL: 1.2 G/DL (ref 1.1–2.5)
ALP SERPL-CCNC: 74 U/L (ref 24–120)
ALT SERPL W P-5'-P-CCNC: 32 U/L (ref 0–54)
ANION GAP SERPL CALCULATED.3IONS-SCNC: 9 MMOL/L (ref 4–13)
APTT PPP: 32.5 SECONDS (ref 24.1–34.8)
AST SERPL-CCNC: 21 U/L (ref 7–45)
BASOPHILS # BLD AUTO: 0.03 10*3/MM3 (ref 0–0.2)
BASOPHILS NFR BLD AUTO: 0.3 % (ref 0–2)
BILIRUB SERPL-MCNC: 0.7 MG/DL (ref 0.1–1)
BLD GP AB SCN SERPL QL: NEGATIVE
BUN BLD-MCNC: 25 MG/DL (ref 5–21)
BUN/CREAT SERPL: 22.7 (ref 7–25)
CALCIUM SPEC-SCNC: 8.5 MG/DL (ref 8.4–10.4)
CHLORIDE SERPL-SCNC: 104 MMOL/L (ref 98–110)
CO2 SERPL-SCNC: 28 MMOL/L (ref 24–31)
CREAT BLD-MCNC: 1.1 MG/DL (ref 0.5–1.4)
DEPRECATED RDW RBC AUTO: 41.8 FL (ref 40–54)
DEPRECATED RDW RBC AUTO: 42.2 FL (ref 40–54)
EOSINOPHIL # BLD AUTO: 0.09 10*3/MM3 (ref 0–0.7)
EOSINOPHIL NFR BLD AUTO: 1 % (ref 0–4)
ERYTHROCYTE [DISTWIDTH] IN BLOOD BY AUTOMATED COUNT: 12.8 % (ref 12–15)
ERYTHROCYTE [DISTWIDTH] IN BLOOD BY AUTOMATED COUNT: 12.9 % (ref 12–15)
GFR SERPL CREATININE-BSD FRML MDRD: 71 ML/MIN/1.73
GLOBULIN UR ELPH-MCNC: 2.8 GM/DL
GLUCOSE BLD-MCNC: 95 MG/DL (ref 70–100)
HCT VFR BLD AUTO: 23.4 % (ref 40–52)
HCT VFR BLD AUTO: 25.3 % (ref 40–52)
HCT VFR BLD AUTO: 30.6 % (ref 40–52)
HGB BLD-MCNC: 10.1 G/DL (ref 14–18)
HGB BLD-MCNC: 7.5 G/DL (ref 14–18)
HGB BLD-MCNC: 8.1 G/DL (ref 14–18)
HOLD SPECIMEN: NORMAL
HOLD SPECIMEN: NORMAL
IMM GRANULOCYTES # BLD: 0.03 10*3/MM3 (ref 0–0.03)
IMM GRANULOCYTES NFR BLD: 0.3 % (ref 0–5)
INR PPP: 1.06 (ref 0.91–1.09)
LYMPHOCYTES # BLD AUTO: 2.2 10*3/MM3 (ref 0.72–4.86)
LYMPHOCYTES NFR BLD AUTO: 25.2 % (ref 15–45)
MCH RBC QN AUTO: 28.7 PG (ref 28–32)
MCH RBC QN AUTO: 29.4 PG (ref 28–32)
MCHC RBC AUTO-ENTMCNC: 32 G/DL (ref 33–36)
MCHC RBC AUTO-ENTMCNC: 33 G/DL (ref 33–36)
MCV RBC AUTO: 89 FL (ref 82–95)
MCV RBC AUTO: 89.7 FL (ref 82–95)
MONOCYTES # BLD AUTO: 0.61 10*3/MM3 (ref 0.19–1.3)
MONOCYTES NFR BLD AUTO: 7 % (ref 4–12)
NEUTROPHILS # BLD AUTO: 5.77 10*3/MM3 (ref 1.87–8.4)
NEUTROPHILS NFR BLD AUTO: 66.2 % (ref 39–78)
NRBC BLD MANUAL-RTO: 0 /100 WBC (ref 0–0)
PLATELET # BLD AUTO: 224 10*3/MM3 (ref 130–400)
PLATELET # BLD AUTO: 248 10*3/MM3 (ref 130–400)
PMV BLD AUTO: 10.3 FL (ref 6–12)
PMV BLD AUTO: 10.4 FL (ref 6–12)
POTASSIUM BLD-SCNC: 4.6 MMOL/L (ref 3.5–5.3)
PROT SERPL-MCNC: 6.1 G/DL (ref 6.3–8.7)
PROTHROMBIN TIME: 14.1 SECONDS (ref 11.9–14.6)
RBC # BLD AUTO: 2.82 10*6/MM3 (ref 4.8–5.9)
RBC # BLD AUTO: 3.44 10*6/MM3 (ref 4.8–5.9)
RH BLD: NEGATIVE
SODIUM BLD-SCNC: 141 MMOL/L (ref 135–145)
WBC NRBC COR # BLD: 6.82 10*3/MM3 (ref 4.8–10.8)
WBC NRBC COR # BLD: 8.73 10*3/MM3 (ref 4.8–10.8)
WHOLE BLOOD HOLD SPECIMEN: NORMAL
WHOLE BLOOD HOLD SPECIMEN: NORMAL

## 2018-02-21 PROCEDURE — 0 IOPAMIDOL 61 % SOLUTION: Performed by: EMERGENCY MEDICINE

## 2018-02-21 PROCEDURE — 74177 CT ABD & PELVIS W/CONTRAST: CPT

## 2018-02-21 PROCEDURE — 85025 COMPLETE CBC W/AUTO DIFF WBC: CPT | Performed by: EMERGENCY MEDICINE

## 2018-02-21 PROCEDURE — 80053 COMPREHEN METABOLIC PANEL: CPT | Performed by: EMERGENCY MEDICINE

## 2018-02-21 PROCEDURE — 85014 HEMATOCRIT: CPT | Performed by: FAMILY MEDICINE

## 2018-02-21 PROCEDURE — G0378 HOSPITAL OBSERVATION PER HR: HCPCS

## 2018-02-21 PROCEDURE — 85027 COMPLETE CBC AUTOMATED: CPT | Performed by: FAMILY MEDICINE

## 2018-02-21 PROCEDURE — 86850 RBC ANTIBODY SCREEN: CPT | Performed by: EMERGENCY MEDICINE

## 2018-02-21 PROCEDURE — 86900 BLOOD TYPING SEROLOGIC ABO: CPT | Performed by: EMERGENCY MEDICINE

## 2018-02-21 PROCEDURE — 86920 COMPATIBILITY TEST SPIN: CPT

## 2018-02-21 PROCEDURE — 86901 BLOOD TYPING SEROLOGIC RH(D): CPT | Performed by: EMERGENCY MEDICINE

## 2018-02-21 PROCEDURE — 85018 HEMOGLOBIN: CPT | Performed by: FAMILY MEDICINE

## 2018-02-21 PROCEDURE — 85730 THROMBOPLASTIN TIME PARTIAL: CPT | Performed by: FAMILY MEDICINE

## 2018-02-21 PROCEDURE — 99285 EMERGENCY DEPT VISIT HI MDM: CPT

## 2018-02-21 PROCEDURE — 85610 PROTHROMBIN TIME: CPT | Performed by: FAMILY MEDICINE

## 2018-02-21 RX ORDER — CARVEDILOL 6.25 MG/1
12.5 TABLET ORAL EVERY 12 HOURS SCHEDULED
Status: DISCONTINUED | OUTPATIENT
Start: 2018-02-21 | End: 2018-02-24 | Stop reason: HOSPADM

## 2018-02-21 RX ORDER — TERAZOSIN 2 MG/1
2 CAPSULE ORAL NIGHTLY
Status: DISCONTINUED | OUTPATIENT
Start: 2018-02-21 | End: 2018-02-24 | Stop reason: HOSPADM

## 2018-02-21 RX ORDER — SODIUM CHLORIDE 0.9 % (FLUSH) 0.9 %
10 SYRINGE (ML) INJECTION AS NEEDED
Status: DISCONTINUED | OUTPATIENT
Start: 2018-02-21 | End: 2018-02-24

## 2018-02-21 RX ORDER — PANTOPRAZOLE SODIUM 40 MG/10ML
40 INJECTION, POWDER, LYOPHILIZED, FOR SOLUTION INTRAVENOUS
Status: DISCONTINUED | OUTPATIENT
Start: 2018-02-22 | End: 2018-02-21

## 2018-02-21 RX ORDER — SODIUM CHLORIDE 9 MG/ML
100 INJECTION, SOLUTION INTRAVENOUS CONTINUOUS
Status: DISCONTINUED | OUTPATIENT
Start: 2018-02-21 | End: 2018-02-24

## 2018-02-21 RX ORDER — SUCRALFATE 1 G/1
1 TABLET ORAL
Status: DISCONTINUED | OUTPATIENT
Start: 2018-02-21 | End: 2018-02-24 | Stop reason: HOSPADM

## 2018-02-21 RX ORDER — PANTOPRAZOLE SODIUM 40 MG/10ML
40 INJECTION, POWDER, LYOPHILIZED, FOR SOLUTION INTRAVENOUS CONTINUOUS
Status: DISCONTINUED | OUTPATIENT
Start: 2018-02-21 | End: 2018-02-21 | Stop reason: SDUPTHER

## 2018-02-21 RX ADMIN — TERAZOSIN HYDROCHLORIDE 2 MG: 2 CAPSULE ORAL at 20:32

## 2018-02-21 RX ADMIN — CARVEDILOL 12.5 MG: 6.25 TABLET, FILM COATED ORAL at 20:32

## 2018-02-21 RX ADMIN — SUCRALFATE 1 G: 1 TABLET ORAL at 20:32

## 2018-02-21 RX ADMIN — IOPAMIDOL 100 ML: 612 INJECTION, SOLUTION INTRAVENOUS at 09:52

## 2018-02-21 RX ADMIN — SODIUM CHLORIDE 8 MG/HR: 900 INJECTION INTRAVENOUS at 20:32

## 2018-02-21 RX ADMIN — SODIUM CHLORIDE 500 ML: 9 INJECTION, SOLUTION INTRAVENOUS at 22:11

## 2018-02-21 RX ADMIN — SODIUM CHLORIDE 100 ML/HR: 9 INJECTION, SOLUTION INTRAVENOUS at 18:32

## 2018-02-22 LAB
DEPRECATED RDW RBC AUTO: 41.7 FL (ref 40–54)
DEPRECATED RDW RBC AUTO: 44 FL (ref 40–54)
DEPRECATED RDW RBC AUTO: 45.1 FL (ref 40–54)
ERYTHROCYTE [DISTWIDTH] IN BLOOD BY AUTOMATED COUNT: 12.9 % (ref 12–15)
ERYTHROCYTE [DISTWIDTH] IN BLOOD BY AUTOMATED COUNT: 14.1 % (ref 12–15)
ERYTHROCYTE [DISTWIDTH] IN BLOOD BY AUTOMATED COUNT: 14.3 % (ref 12–15)
HCT VFR BLD AUTO: 21.9 % (ref 40–52)
HCT VFR BLD AUTO: 24.8 % (ref 40–52)
HCT VFR BLD AUTO: 25.7 % (ref 40–52)
HCT VFR BLD AUTO: 25.9 % (ref 40–52)
HGB BLD-MCNC: 7.1 G/DL (ref 14–18)
HGB BLD-MCNC: 8.2 G/DL (ref 14–18)
HGB BLD-MCNC: 8.6 G/DL (ref 14–18)
HGB BLD-MCNC: 8.6 G/DL (ref 14–18)
MCH RBC QN AUTO: 29.2 PG (ref 28–32)
MCHC RBC AUTO-ENTMCNC: 32.4 G/DL (ref 33–36)
MCHC RBC AUTO-ENTMCNC: 33.2 G/DL (ref 33–36)
MCHC RBC AUTO-ENTMCNC: 33.5 G/DL (ref 33–36)
MCV RBC AUTO: 87.1 FL (ref 82–95)
MCV RBC AUTO: 87.8 FL (ref 82–95)
MCV RBC AUTO: 90.1 FL (ref 82–95)
PLATELET # BLD AUTO: 197 10*3/MM3 (ref 130–400)
PLATELET # BLD AUTO: 199 10*3/MM3 (ref 130–400)
PLATELET # BLD AUTO: 200 10*3/MM3 (ref 130–400)
PMV BLD AUTO: 10.2 FL (ref 6–12)
PMV BLD AUTO: 10.2 FL (ref 6–12)
PMV BLD AUTO: 11.1 FL (ref 6–12)
RBC # BLD AUTO: 2.43 10*6/MM3 (ref 4.8–5.9)
RBC # BLD AUTO: 2.95 10*6/MM3 (ref 4.8–5.9)
RBC # BLD AUTO: 2.95 10*6/MM3 (ref 4.8–5.9)
WBC NRBC COR # BLD: 7.14 10*3/MM3 (ref 4.8–10.8)
WBC NRBC COR # BLD: 7.68 10*3/MM3 (ref 4.8–10.8)
WBC NRBC COR # BLD: 8.16 10*3/MM3 (ref 4.8–10.8)

## 2018-02-22 PROCEDURE — 86900 BLOOD TYPING SEROLOGIC ABO: CPT

## 2018-02-22 PROCEDURE — 85027 COMPLETE CBC AUTOMATED: CPT | Performed by: FAMILY MEDICINE

## 2018-02-22 PROCEDURE — P9016 RBC LEUKOCYTES REDUCED: HCPCS

## 2018-02-22 PROCEDURE — 99222 1ST HOSP IP/OBS MODERATE 55: CPT | Performed by: INTERNAL MEDICINE

## 2018-02-22 PROCEDURE — 85018 HEMOGLOBIN: CPT | Performed by: CLINICAL NURSE SPECIALIST

## 2018-02-22 PROCEDURE — 85014 HEMATOCRIT: CPT | Performed by: CLINICAL NURSE SPECIALIST

## 2018-02-22 PROCEDURE — 30233N1 TRANSFUSION OF NONAUTOLOGOUS RED BLOOD CELLS INTO PERIPHERAL VEIN, PERCUTANEOUS APPROACH: ICD-10-PCS | Performed by: FAMILY MEDICINE

## 2018-02-22 PROCEDURE — 36430 TRANSFUSION BLD/BLD COMPNT: CPT

## 2018-02-22 RX ADMIN — TERAZOSIN HYDROCHLORIDE 2 MG: 2 CAPSULE ORAL at 22:02

## 2018-02-22 RX ADMIN — SUCRALFATE 1 G: 1 TABLET ORAL at 22:03

## 2018-02-22 RX ADMIN — SUCRALFATE 1 G: 1 TABLET ORAL at 17:35

## 2018-02-22 RX ADMIN — SODIUM CHLORIDE 8 MG/HR: 900 INJECTION INTRAVENOUS at 07:44

## 2018-02-22 RX ADMIN — SODIUM CHLORIDE 100 ML/HR: 9 INJECTION, SOLUTION INTRAVENOUS at 04:21

## 2018-02-22 RX ADMIN — SODIUM CHLORIDE 100 ML/HR: 9 INJECTION, SOLUTION INTRAVENOUS at 12:47

## 2018-02-22 RX ADMIN — SUCRALFATE 1 G: 1 TABLET ORAL at 10:34

## 2018-02-22 RX ADMIN — SODIUM CHLORIDE 8 MG/HR: 900 INJECTION INTRAVENOUS at 12:47

## 2018-02-22 RX ADMIN — POLYETHYLENE GLYCOL 3350, SODIUM SULFATE ANHYDROUS, SODIUM BICARBONATE, SODIUM CHLORIDE, POTASSIUM CHLORIDE 4000 ML: 236; 22.74; 6.74; 5.86; 2.97 POWDER, FOR SOLUTION ORAL at 10:30

## 2018-02-22 RX ADMIN — SODIUM CHLORIDE 8 MG/HR: 900 INJECTION INTRAVENOUS at 17:35

## 2018-02-22 RX ADMIN — SODIUM CHLORIDE 8 MG/HR: 900 INJECTION INTRAVENOUS at 02:23

## 2018-02-22 RX ADMIN — SODIUM CHLORIDE 8 MG/HR: 900 INJECTION INTRAVENOUS at 22:51

## 2018-02-22 RX ADMIN — SODIUM CHLORIDE 100 ML/HR: 9 INJECTION, SOLUTION INTRAVENOUS at 22:49

## 2018-02-23 ENCOUNTER — ANESTHESIA EVENT (OUTPATIENT)
Dept: GASTROENTEROLOGY | Facility: HOSPITAL | Age: 48
End: 2018-02-23

## 2018-02-23 ENCOUNTER — ANESTHESIA (OUTPATIENT)
Dept: GASTROENTEROLOGY | Facility: HOSPITAL | Age: 48
End: 2018-02-23

## 2018-02-23 LAB
ABO + RH BLD: NORMAL
ABO + RH BLD: NORMAL
BH BB BLOOD EXPIRATION DATE: NORMAL
BH BB BLOOD EXPIRATION DATE: NORMAL
BH BB BLOOD TYPE BARCODE: 9500
BH BB BLOOD TYPE BARCODE: 9500
BH BB DISPENSE STATUS: NORMAL
BH BB DISPENSE STATUS: NORMAL
BH BB PRODUCT CODE: NORMAL
BH BB PRODUCT CODE: NORMAL
BH BB UNIT NUMBER: NORMAL
BH BB UNIT NUMBER: NORMAL
CROSSMATCH INTERPRETATION: NORMAL
CROSSMATCH INTERPRETATION: NORMAL
HCT VFR BLD AUTO: 23.8 % (ref 40–52)
HCT VFR BLD AUTO: 24.8 % (ref 40–52)
HCT VFR BLD AUTO: 25.5 % (ref 40–52)
HGB BLD-MCNC: 7.9 G/DL (ref 14–18)
HGB BLD-MCNC: 8.1 G/DL (ref 14–18)
HGB BLD-MCNC: 8.3 G/DL (ref 14–18)
UNIT  ABO: NORMAL
UNIT  ABO: NORMAL
UNIT  RH: NORMAL
UNIT  RH: NORMAL

## 2018-02-23 PROCEDURE — 0W3P8ZZ CONTROL BLEEDING IN GASTROINTESTINAL TRACT, VIA NATURAL OR ARTIFICIAL OPENING ENDOSCOPIC: ICD-10-PCS | Performed by: INTERNAL MEDICINE

## 2018-02-23 PROCEDURE — 85018 HEMOGLOBIN: CPT | Performed by: INTERNAL MEDICINE

## 2018-02-23 PROCEDURE — 85018 HEMOGLOBIN: CPT | Performed by: CLINICAL NURSE SPECIALIST

## 2018-02-23 PROCEDURE — 45382 COLONOSCOPY W/CONTROL BLEED: CPT | Performed by: INTERNAL MEDICINE

## 2018-02-23 PROCEDURE — 85014 HEMATOCRIT: CPT | Performed by: CLINICAL NURSE SPECIALIST

## 2018-02-23 PROCEDURE — 43235 EGD DIAGNOSTIC BRUSH WASH: CPT | Performed by: INTERNAL MEDICINE

## 2018-02-23 PROCEDURE — 25010000002 PROPOFOL 10 MG/ML EMULSION: Performed by: NURSE ANESTHETIST, CERTIFIED REGISTERED

## 2018-02-23 PROCEDURE — 85014 HEMATOCRIT: CPT | Performed by: INTERNAL MEDICINE

## 2018-02-23 PROCEDURE — 0DJ08ZZ INSPECTION OF UPPER INTESTINAL TRACT, VIA NATURAL OR ARTIFICIAL OPENING ENDOSCOPIC: ICD-10-PCS | Performed by: INTERNAL MEDICINE

## 2018-02-23 DEVICE — DEV CLIP ENDO RESOLUTION360 CONTRL ROT 235CM: Type: IMPLANTABLE DEVICE | Site: TRANSVERSE COLON | Status: FUNCTIONAL

## 2018-02-23 RX ORDER — PROPOFOL 10 MG/ML
VIAL (ML) INTRAVENOUS AS NEEDED
Status: DISCONTINUED | OUTPATIENT
Start: 2018-02-23 | End: 2018-02-23 | Stop reason: SURG

## 2018-02-23 RX ORDER — SODIUM CHLORIDE 0.9 % (FLUSH) 0.9 %
1-10 SYRINGE (ML) INJECTION AS NEEDED
Status: DISCONTINUED | OUTPATIENT
Start: 2018-02-23 | End: 2018-02-23 | Stop reason: HOSPADM

## 2018-02-23 RX ORDER — SODIUM CHLORIDE 9 MG/ML
100 INJECTION, SOLUTION INTRAVENOUS CONTINUOUS
Status: DISCONTINUED | OUTPATIENT
Start: 2018-02-23 | End: 2018-02-23 | Stop reason: HOSPADM

## 2018-02-23 RX ORDER — LIDOCAINE HYDROCHLORIDE 20 MG/ML
INJECTION, SOLUTION INFILTRATION; PERINEURAL AS NEEDED
Status: DISCONTINUED | OUTPATIENT
Start: 2018-02-23 | End: 2018-02-23 | Stop reason: SURG

## 2018-02-23 RX ADMIN — SUCRALFATE 1 G: 1 TABLET ORAL at 21:11

## 2018-02-23 RX ADMIN — SODIUM CHLORIDE 100 ML/HR: 9 INJECTION, SOLUTION INTRAVENOUS at 08:45

## 2018-02-23 RX ADMIN — CARVEDILOL 12.5 MG: 6.25 TABLET, FILM COATED ORAL at 08:45

## 2018-02-23 RX ADMIN — SODIUM CHLORIDE 100 ML/HR: 9 INJECTION, SOLUTION INTRAVENOUS at 11:40

## 2018-02-23 RX ADMIN — LIDOCAINE HYDROCHLORIDE 100 MG: 20 INJECTION, SOLUTION INFILTRATION; PERINEURAL at 12:05

## 2018-02-23 RX ADMIN — PROPOFOL 200 MG: 10 INJECTION, EMULSION INTRAVENOUS at 12:20

## 2018-02-23 RX ADMIN — PROPOFOL 200 MG: 10 INJECTION, EMULSION INTRAVENOUS at 12:09

## 2018-02-23 RX ADMIN — POLYETHYLENE GLYCOL 3350, SODIUM SULFATE ANHYDROUS, SODIUM BICARBONATE, SODIUM CHLORIDE, POTASSIUM CHLORIDE 1000 ML: 236; 22.74; 6.74; 5.86; 2.97 POWDER, FOR SOLUTION ORAL at 05:05

## 2018-02-23 RX ADMIN — SODIUM CHLORIDE 100 ML/HR: 9 INJECTION, SOLUTION INTRAVENOUS at 20:01

## 2018-02-23 RX ADMIN — SODIUM CHLORIDE 8 MG/HR: 900 INJECTION INTRAVENOUS at 16:27

## 2018-02-23 RX ADMIN — SODIUM CHLORIDE 8 MG/HR: 900 INJECTION INTRAVENOUS at 04:12

## 2018-02-23 RX ADMIN — PROPOFOL 200 MG: 10 INJECTION, EMULSION INTRAVENOUS at 12:05

## 2018-02-23 RX ADMIN — SUCRALFATE 1 G: 1 TABLET ORAL at 17:14

## 2018-02-23 RX ADMIN — TERAZOSIN HYDROCHLORIDE 2 MG: 2 CAPSULE ORAL at 21:11

## 2018-02-23 RX ADMIN — SODIUM CHLORIDE 8 MG/HR: 900 INJECTION INTRAVENOUS at 08:45

## 2018-02-23 RX ADMIN — SODIUM CHLORIDE 8 MG/HR: 900 INJECTION INTRAVENOUS at 20:00

## 2018-02-23 NOTE — ANESTHESIA POSTPROCEDURE EVALUATION
"Patient: Jerome Crawley    Procedure Summary     Date Anesthesia Start Anesthesia Stop Room / Location    02/23/18 1201 1237 [facility] ENDOSCOPY 4 / [facility] ENDOSCOPY       Procedure Diagnosis Surgeon Provider    ESOPHAGOGASTRODUODENOSCOPY WITH ANESTHESIA (N/A Esophagus); COLONOSCOPY WITH ANESTHESIA (N/A ) History of GI bleed  (History of GI bleed [Z87.19]) MD Malcom Bourne CRNA          Anesthesia Type: general  Last vitals  BP   142/77 (02/23/18 0750)   Temp   98.7 °F (37.1 °C) (02/23/18 0750)   Pulse   65 (02/23/18 0750)   Resp   16 (02/23/18 0750)     SpO2   96 % (02/23/18 0750)     Post Anesthesia Care and Evaluation    Patient location during evaluation: PACU  Patient participation: complete - patient participated  Level of consciousness: awake and alert  Pain score: 1  Pain management: adequate  Airway patency: patent  Anesthetic complications: No anesthetic complications    Cardiovascular status: acceptable  Respiratory status: room air  Hydration status: acceptable    Blood pressure 142/77, pulse 65, temperature 98.7 °F (37.1 °C), temperature source Oral, resp. rate 16, height 180.3 cm (71\"), weight 102 kg (225 lb), SpO2 96 %.      "

## 2018-02-23 NOTE — ANESTHESIA PREPROCEDURE EVALUATION
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

## 2018-02-24 VITALS
TEMPERATURE: 98 F | DIASTOLIC BLOOD PRESSURE: 86 MMHG | SYSTOLIC BLOOD PRESSURE: 140 MMHG | RESPIRATION RATE: 16 BRPM | OXYGEN SATURATION: 96 % | WEIGHT: 225 LBS | BODY MASS INDEX: 31.5 KG/M2 | HEIGHT: 71 IN | HEART RATE: 68 BPM

## 2018-02-24 LAB
HCT VFR BLD AUTO: 24.5 % (ref 40–52)
HGB BLD-MCNC: 7.9 G/DL (ref 14–18)

## 2018-02-24 PROCEDURE — 85014 HEMATOCRIT: CPT | Performed by: INTERNAL MEDICINE

## 2018-02-24 PROCEDURE — 99232 SBSQ HOSP IP/OBS MODERATE 35: CPT | Performed by: INTERNAL MEDICINE

## 2018-02-24 PROCEDURE — 85018 HEMOGLOBIN: CPT | Performed by: INTERNAL MEDICINE

## 2018-02-24 RX ORDER — PANTOPRAZOLE SODIUM 40 MG/1
40 TABLET, DELAYED RELEASE ORAL
Status: DISCONTINUED | OUTPATIENT
Start: 2018-02-24 | End: 2018-02-24 | Stop reason: HOSPADM

## 2018-02-24 RX ORDER — IRON POLYSACCHARIDE COMPLEX 150 MG
150 CAPSULE ORAL 2 TIMES DAILY
Qty: 60 CAPSULE | Refills: 0 | Status: SHIPPED | OUTPATIENT
Start: 2018-02-24 | End: 2020-04-24

## 2018-02-24 RX ADMIN — SODIUM CHLORIDE 100 ML/HR: 9 INJECTION, SOLUTION INTRAVENOUS at 04:15

## 2018-02-24 RX ADMIN — CARVEDILOL 12.5 MG: 6.25 TABLET, FILM COATED ORAL at 09:01

## 2018-02-24 RX ADMIN — SUCRALFATE 1 G: 1 TABLET ORAL at 09:01

## 2018-02-24 RX ADMIN — PANTOPRAZOLE SODIUM 40 MG: 40 TABLET, DELAYED RELEASE ORAL at 09:01

## 2018-02-24 RX ADMIN — SODIUM CHLORIDE 8 MG/HR: 900 INJECTION INTRAVENOUS at 04:15

## 2018-09-06 ENCOUNTER — TELEPHONE (OUTPATIENT)
Dept: BARIATRICS/WEIGHT MGMT | Facility: CLINIC | Age: 48
End: 2018-09-06

## 2018-09-06 NOTE — TELEPHONE ENCOUNTER
Patient called back to say that he doesn't think his insurance will cover for him to come back. Appt was not made at this time.       Tried to call patient regarding annual appointment to be scheduled in October. No answer and could not leave voicemail. Letter will be sent.

## 2019-07-05 NOTE — PLAN OF CARE
Due to a change in the providers schedule , pt needs to r/s their 7/8 appt. Talked with pt and explained the change. Offered pt a new appt time for 8am. Pt declined. R/s appt to 7/29 at 3pm   Problem: Patient Care Overview (Adult)  Goal: Plan of Care Review  Outcome: Ongoing (interventions implemented as appropriate)   02/12/18 0955   Patient Care Overview   Progress improving   Outcome Evaluation   Outcome Summary/Follow up Plan pt tolerating procedure well        Problem: GI Endoscopy (Adult)  Goal: Signs and Symptoms of Listed Potential Problems Will be Absent or Manageable (GI Endoscopy)  Outcome: Ongoing (interventions implemented as appropriate)   02/12/18 0955   GI Endoscopy   Problems Assessed (GI Endoscopy) all   Problems Present (GI Endoscopy) none

## 2019-10-17 ENCOUNTER — TELEPHONE (OUTPATIENT)
Dept: BARIATRICS/WEIGHT MGMT | Facility: CLINIC | Age: 49
End: 2019-10-17

## 2019-10-17 NOTE — TELEPHONE ENCOUNTER
Patient returned my call and said that his insurance will no longer cover bariatrics because it is an elective. He said that the last time he came in, he was billed for $360.

## 2019-10-17 NOTE — TELEPHONE ENCOUNTER
It is time to schedule the patient's annual appointment in our office. I left him a voicemail to call the office and I will send him a letter.

## 2021-02-23 ENCOUNTER — TELEPHONE (OUTPATIENT)
Dept: GASTROENTEROLOGY | Facility: CLINIC | Age: 51
End: 2021-02-23

## 2021-03-16 ENCOUNTER — OFFICE VISIT (OUTPATIENT)
Dept: GASTROENTEROLOGY | Facility: CLINIC | Age: 51
End: 2021-03-16

## 2021-03-16 VITALS
DIASTOLIC BLOOD PRESSURE: 76 MMHG | WEIGHT: 260 LBS | SYSTOLIC BLOOD PRESSURE: 116 MMHG | TEMPERATURE: 96.4 F | OXYGEN SATURATION: 98 % | HEART RATE: 70 BPM | HEIGHT: 71 IN | BODY MASS INDEX: 36.4 KG/M2

## 2021-03-16 DIAGNOSIS — I10 HYPERTENSION, UNSPECIFIED TYPE: ICD-10-CM

## 2021-03-16 DIAGNOSIS — Z86.010 HISTORY OF ADENOMATOUS POLYP OF COLON: Primary | ICD-10-CM

## 2021-03-16 PROCEDURE — 99214 OFFICE O/P EST MOD 30 MIN: CPT | Performed by: NURSE PRACTITIONER

## 2021-03-16 RX ORDER — BENAZEPRIL HYDROCHLORIDE 20 MG/1
20 TABLET ORAL DAILY
COMMUNITY

## 2021-03-16 RX ORDER — AMLODIPINE BESYLATE 5 MG/1
5 TABLET ORAL DAILY
COMMUNITY

## 2021-03-16 NOTE — PROGRESS NOTES
[facility] Gastroenterology    Primary Physician Sanjay Tyson MD    3/16/2021    Jerome Crawley   1970      Chief Complaint   Patient presents with   • Colonoscopy       Subjective     HPI    Jerome Crawley is a 50 y.o. male who presents as a referral for preventative maintenance. He has no complaints of nausea or vomiting. No change in bowels. No wt loss. No BRBPR. No melena. No abdominal pain.       Colonoscopy was 2-23-18   - The examined portion of the ileum was normal.  - Bleeding in the mid transverse colon secondary to previous polypectomy. Clip (MR conditional) was  placed.  - small amount of old Blood in the entire examined colon.  - No specimens collected.    Colonoscopy 2-12-18   - One 8 mm polyp in the distal ascending colon, removed with a hot snare. Resected and retrieved.  - One 5 mm polyp in the proximal transverse colon, removed with a hot snare. Resected and retrieved.  - One 12 mm polyp in the mid transverse colon, removed with a hot snare. Resected and retrieved.  Clip (MR conditional) was placed.  - Internal hemorrhoids.  - The examination was otherwise normal on direct and retroflexion views.  Path tubular adenomatous.       No family history of colon cancer or polyps.     Past Medical History:   Diagnosis Date   • Abdominal hernia    • GERD (gastroesophageal reflux disease)     Mild   • Hyperlipidemia    • Hypertension    • Sleep apnea     CPAP; no longer needed       Past Surgical History:   Procedure Laterality Date   • BRONCHOSCOPY  12/16/2015    Flexible Fiberoptic Bronchoscopy with Debridement of Tracheal Fibrinous exudate   • COLONOSCOPY N/A 2/12/2018    Procedure: COLONOSCOPY WITH ANESTHESIA;  Surgeon: Bruce Cruz MD;  Location: [facility] ENDOSCOPY;  Service:    • COLONOSCOPY N/A 2/23/2018    Procedure: COLONOSCOPY WITH ANESTHESIA;  Surgeon: Bruce Cruz MD;  Location: [facility] ENDOSCOPY;  Service:    • ENDOSCOPY  12/11/2015    Dr Albaro Castillo  [place]     • ENDOSCOPY  12/29/2017    done at [facility] in [place].    • ENDOSCOPY N/A 2/12/2018    Procedure: ESOPHAGOGASTRODUODENOSCOPY WITH ANESTHESIA;  Surgeon: Bruce Cruz MD;  Location: [facility] ENDOSCOPY;  Service:    • ENDOSCOPY N/A 2/23/2018    Procedure: ESOPHAGOGASTRODUODENOSCOPY WITH ANESTHESIA;  Surgeon: Bruce Cruz MD;  Location: [facility] ENDOSCOPY;  Service:    • EXPLORATORY LAPAROTOMY  11/29/2015    Dr Shay    • EXPLORATORY LAPAROTOMY  12/16/2015    with washout, lysis of adhesions, feeding tube placement and repair of small bowel injury and esophagogastroduodenoscopy with Dr Hendricks    • HAND SURGERY  2007    Crushed hand    • HERNIA REPAIR  11/29/2015    Hernia reduction of internal hernia-Dr Shay [facility]     • LAPAROSCOPIC GASTRIC BYPASS  11/25/2015    Dr Shay [facility]    • LYSIS OF ABDOMINAL ADHESIONS  11/29/2015    Dr Irvin [facility]    • MANDIBLE FRACTURE SURGERY  1988   • SMALL INTESTINE SURGERY  11/29/2015    repair of small bowel injury and closure of mesenteric defect, Dr Shay [facility]      • SMALL INTESTINE SURGERY      repair per Dr Shay on 12-16-15   • TRACHEOSTOMY  12/16/2015    Dr Shay        Outpatient Medications Marked as Taking for the 3/16/21 encounter (Office Visit) with Nerissa Verduzco APRN   Medication Sig Dispense Refill   • amLODIPine (NORVASC) 5 MG tablet Take 5 mg by mouth Daily.     • benazepril (LOTENSIN) 20 MG tablet Take 20 mg by mouth Daily.     • carvedilol (COREG) 12.5 MG tablet 1 TABLET BY MOUTH TWICE A DAY TAKES THE PLACE OF COREG CR 40MG  1   • doxazosin (CARDURA) 2 MG tablet Take 2 mg by mouth Every Night.     • Multiple Vitamin (MULTI VITAMIN PO) Take  by mouth Daily. Bariatric Vitamin     • omeprazole OTC (PriLOSEC OTC) 20 MG EC tablet Take 20 mg by mouth Daily.         Allergies   Allergen Reactions   • Augmentin [Amoxicillin-Pot  Clavulanate] Diarrhea   • Other Other (See Comments)     Dust (sneezing)       Social History     Socioeconomic History   • Marital status: Single     Spouse name: Not on file   • Number of children: Not on file   • Years of education: Not on file   • Highest education level: Not on file   Tobacco Use   • Smoking status: Never Smoker   • Smokeless tobacco: Current User   Substance and Sexual Activity   • Alcohol use: Yes     Comment: rare   • Drug use: No   • Sexual activity: Defer       Family History   Problem Relation Age of Onset   • Obesity Other    • Diabetes Other    • Hypertension Other    • Heart disease Other    • Cancer Other    • Colon cancer Neg Hx    • Colon polyps Neg Hx        Review of Systems   Constitutional: Negative for chills, fever and unexpected weight change.   Respiratory: Negative for cough, shortness of breath and wheezing.    Cardiovascular: Negative for chest pain and palpitations.   Gastrointestinal: Negative for abdominal distention, abdominal pain, anal bleeding, blood in stool, constipation, diarrhea, nausea and vomiting.       Objective     Vitals:    03/16/21 1433   BP: 116/76   Pulse: 70   Temp: 96.4 °F (35.8 °C)   SpO2: 98%         03/16/21  1433   Weight: 118 kg (260 lb)     Body mass index is 36.26 kg/m².    Physical Exam  Vitals reviewed.   Constitutional:       General: He is not in acute distress.  Cardiovascular:      Rate and Rhythm: Normal rate and regular rhythm.      Heart sounds: Normal heart sounds.   Pulmonary:      Effort: Pulmonary effort is normal.      Breath sounds: Normal breath sounds.   Abdominal:      General: Bowel sounds are normal. There is no distension.      Palpations: Abdomen is soft.      Tenderness: There is no abdominal tenderness.   Skin:     General: Skin is warm and dry.   Neurological:      Mental Status: He is alert.         Imaging Results (Most Recent)     None          Assessment/Plan     Diagnoses and all orders for this visit:    1.  History of adenomatous polyp of colon (Primary)  -     Case Request; Standing  -     Case Request    2. Hypertension, unspecified type    Other orders  -     Follow Anesthesia Guidelines / Protocol; Future  -     Implement Anesthesia Orders Day of Procedure; Standing  -     Obtain Informed Consent; Standing  -     Obtain Informed Consent; Future          Plan for colonoscopy. Use miralax prep.The patient was advised to take any blood pressure or heart  medications the morning of  procedure if that is when he/she normally takes.             Body mass index is 36.26 kg/m².    Patient's Body mass index is 36.26 kg/m². BMI is above normal parameters. Recommendations include: recommend weight loss, no f/u .      COLONOSCOPY WITH ANESTHESIA (N/A)  All risks, benefits, alternatives, and indications of colonoscopy procedure have been discussed with the patient. Risks to include perforation of the colon requiring possible surgery or colostomy, risk of bleeding from biopsies or removal of colon tissue, possibility of missing a colon polyp or cancer, or adverse drug reaction.  Benefits to include the diagnosis and management of disease of the colon and rectum. Alternatives to include barium enema, radiographic evaluation, lab testing or no intervention. Pt verbalizes understanding and agrees.     MIGUEL ANGEL Schofield      EMR Dragon/transcription disclaimer:  Much of this encounter note is electronic transcription/translation of spoken language to printed text.  The electronic translation of spoken language may be erroneous, or at times, nonsensical words or phrases may be inadvertently transcribed.  Although I have reviewed the note for such errors, some may still exist.

## 2021-04-12 ENCOUNTER — TRANSCRIBE ORDERS (OUTPATIENT)
Dept: GASTROENTEROLOGY | Facility: CLINIC | Age: 51
End: 2021-04-12

## 2021-04-12 DIAGNOSIS — Z01.818 PREOPERATIVE TESTING: Primary | ICD-10-CM

## 2021-04-13 ENCOUNTER — LAB (OUTPATIENT)
Dept: LAB | Facility: HOSPITAL | Age: 51
End: 2021-04-13

## 2021-04-13 LAB — SARS-COV-2 ORF1AB RESP QL NAA+PROBE: NOT DETECTED

## 2021-04-13 PROCEDURE — U0004 COV-19 TEST NON-CDC HGH THRU: HCPCS | Performed by: INTERNAL MEDICINE

## 2021-04-13 PROCEDURE — C9803 HOPD COVID-19 SPEC COLLECT: HCPCS | Performed by: INTERNAL MEDICINE

## 2021-05-19 ENCOUNTER — HOSPITAL ENCOUNTER (OUTPATIENT)
Facility: HOSPITAL | Age: 51
Setting detail: HOSPITAL OUTPATIENT SURGERY
Discharge: HOME | End: 2021-05-19
Attending: INTERNAL MEDICINE | Admitting: INTERNAL MEDICINE

## 2021-05-19 ENCOUNTER — ANESTHESIA EVENT (OUTPATIENT)
Dept: GASTROENTEROLOGY | Facility: HOSPITAL | Age: 51
End: 2021-05-19

## 2021-05-19 ENCOUNTER — ANESTHESIA (OUTPATIENT)
Dept: GASTROENTEROLOGY | Facility: HOSPITAL | Age: 51
End: 2021-05-19

## 2021-05-19 VITALS
OXYGEN SATURATION: 97 % | HEART RATE: 64 BPM | SYSTOLIC BLOOD PRESSURE: 133 MMHG | WEIGHT: 249 LBS | DIASTOLIC BLOOD PRESSURE: 85 MMHG | HEIGHT: 71 IN | BODY MASS INDEX: 34.86 KG/M2 | RESPIRATION RATE: 18 BRPM | TEMPERATURE: 97.1 F

## 2021-05-19 DIAGNOSIS — Z86.010 HISTORY OF ADENOMATOUS POLYP OF COLON: ICD-10-CM

## 2021-05-19 PROCEDURE — 45385 COLONOSCOPY W/LESION REMOVAL: CPT | Performed by: INTERNAL MEDICINE

## 2021-05-19 PROCEDURE — 25010000002 PROPOFOL 10 MG/ML EMULSION: Performed by: NURSE ANESTHETIST, CERTIFIED REGISTERED

## 2021-05-19 PROCEDURE — 88305 TISSUE EXAM BY PATHOLOGIST: CPT | Performed by: INTERNAL MEDICINE

## 2021-05-19 RX ORDER — SODIUM CHLORIDE 0.9 % (FLUSH) 0.9 %
10 SYRINGE (ML) INJECTION AS NEEDED
Status: DISCONTINUED | OUTPATIENT
Start: 2021-05-19 | End: 2021-05-19 | Stop reason: HOSPADM

## 2021-05-19 RX ORDER — PROPOFOL 10 MG/ML
VIAL (ML) INTRAVENOUS AS NEEDED
Status: DISCONTINUED | OUTPATIENT
Start: 2021-05-19 | End: 2021-05-19 | Stop reason: SURG

## 2021-05-19 RX ORDER — ONDANSETRON 2 MG/ML
4 INJECTION INTRAMUSCULAR; INTRAVENOUS ONCE AS NEEDED
Status: DISCONTINUED | OUTPATIENT
Start: 2021-05-19 | End: 2021-05-19 | Stop reason: HOSPADM

## 2021-05-19 RX ORDER — SODIUM CHLORIDE 9 MG/ML
500 INJECTION, SOLUTION INTRAVENOUS CONTINUOUS PRN
Status: DISCONTINUED | OUTPATIENT
Start: 2021-05-19 | End: 2021-05-19 | Stop reason: HOSPADM

## 2021-05-19 RX ORDER — LIDOCAINE HYDROCHLORIDE 10 MG/ML
0.5 INJECTION, SOLUTION EPIDURAL; INFILTRATION; INTRACAUDAL; PERINEURAL ONCE AS NEEDED
Status: CANCELLED | OUTPATIENT
Start: 2021-05-19

## 2021-05-19 RX ORDER — LIDOCAINE HYDROCHLORIDE 20 MG/ML
INJECTION, SOLUTION EPIDURAL; INFILTRATION; INTRACAUDAL; PERINEURAL AS NEEDED
Status: DISCONTINUED | OUTPATIENT
Start: 2021-05-19 | End: 2021-05-19 | Stop reason: SURG

## 2021-05-19 RX ADMIN — SODIUM CHLORIDE 500 ML: 9 INJECTION, SOLUTION INTRAVENOUS at 07:13

## 2021-05-19 RX ADMIN — PROPOFOL 400 MG: 10 INJECTION, EMULSION INTRAVENOUS at 07:51

## 2021-05-19 RX ADMIN — LIDOCAINE HYDROCHLORIDE 40 MG: 20 INJECTION, SOLUTION EPIDURAL; INFILTRATION; INTRACAUDAL; PERINEURAL at 07:51

## 2021-05-19 NOTE — ANESTHESIA POSTPROCEDURE EVALUATION
Patient: Jerome Crawley    Procedure Summary     Date: 05/19/21 Room / Location: [facility] ENDOSCOPY 2 / [facility] ENDOSCOPY    Anesthesia Start: 0751 Anesthesia Stop: 0815    Procedure: COLONOSCOPY WITH ANESTHESIA (N/A ) Diagnosis:       History of adenomatous polyp of colon      (History of adenomatous polyp of colon [Z86.010])    Surgeons: Bruce Cruz MD Provider: Robert Carreno CRNA    Anesthesia Type: MAC ASA Status: 2          Anesthesia Type: MAC    Vitals  Vitals Value Taken Time   /85 05/19/21 0830   Temp     Pulse 59 05/19/21 0832   Resp 18 05/19/21 0830   SpO2 96 % 05/19/21 0832   Vitals shown include unvalidated device data.        Post Anesthesia Care and Evaluation    Patient location during evaluation: PHASE II  Patient participation: complete - patient participated  Level of consciousness: awake  Pain management: adequate  Airway patency: patent  Anesthetic complications: No anesthetic complications  PONV Status: none  Cardiovascular status: acceptable  Respiratory status: acceptable  Hydration status: acceptable  No anesthesia care post op

## 2021-05-19 NOTE — ANESTHESIA PREPROCEDURE EVALUATION
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    Musculoskeletal     Abdominal    Substance History      OB/GYN          Other                        Anesthesia Plan    ASA 2     MAC     intravenous induction     Anesthetic plan, all risks, benefits, and alternatives have been provided, discussed and informed consent has been obtained with: patient.

## 2021-05-20 LAB
LAB AP CASE REPORT: NORMAL
PATH REPORT.FINAL DX SPEC: NORMAL
PATH REPORT.GROSS SPEC: NORMAL

## 2021-08-16 ENCOUNTER — TELEPHONE (OUTPATIENT)
Dept: URGENT CARE | Facility: CLINIC | Age: 51
End: 2021-08-16

## 2021-08-16 DIAGNOSIS — H65.113 ACUTE MUCOID OTITIS MEDIA OF BOTH EARS: Primary | ICD-10-CM

## 2021-08-16 RX ORDER — CEFDINIR 300 MG/1
300 CAPSULE ORAL 2 TIMES DAILY
Qty: 14 CAPSULE | Refills: 0 | Status: SHIPPED | OUTPATIENT
Start: 2021-08-16 | End: 2021-08-23

## (undated) DEVICE — TBG SMPL FLTR LINE NASL 02/C02 A/ BX/100

## (undated) DEVICE — MSK O2 MD CONCENTR A/ LF 7FT 1P/U

## (undated) DEVICE — ENDOGATOR AUXILIARY WATER JET CONNECTOR: Brand: ENDOGATOR

## (undated) DEVICE — CUFF,BP,DISP,1 TUBE,ADULT,HP: Brand: MEDLINE

## (undated) DEVICE — THE CHANNEL CLEANING BRUSH IS A NYLON FLEXI BRUSH ATTACHED TO A FLEXIBLE PLASTIC SHEATH DESIGNED TO SAFELY REMOVE DEBRIS FROM FLEXIBLE ENDOSCOPES.

## (undated) DEVICE — SENSR O2 OXIMAX FNGR A/ 18IN NONSTR

## (undated) DEVICE — THE SINGLE USE ETRAP – POLYP TRAP IS USED FOR SUCTION RETRIEVAL OF ENDOSCOPICALLY REMOVED POLYPS.: Brand: ETRAP

## (undated) DEVICE — SNAR POLYP SENSATION MICRO OVL 13 240X40

## (undated) DEVICE — MASK,OXYGEN,MED CONC,ADLT,7' TUB, UC: Brand: PENDING

## (undated) DEVICE — Device: Brand: DEFENDO AIR/WATER/SUCTION AND BIOPSY VALVE

## (undated) DEVICE — CONMED SCOPE SAVER BITE BLOCK, 20X27 MM: Brand: SCOPE SAVER

## (undated) DEVICE — YANKAUER,BULB TIP WITH VENT: Brand: ARGYLE